# Patient Record
Sex: MALE | Race: WHITE | NOT HISPANIC OR LATINO | Employment: FULL TIME | URBAN - METROPOLITAN AREA
[De-identification: names, ages, dates, MRNs, and addresses within clinical notes are randomized per-mention and may not be internally consistent; named-entity substitution may affect disease eponyms.]

---

## 2020-06-11 ENCOUNTER — TELEPHONE (OUTPATIENT)
Dept: PULMONOLOGY | Facility: CLINIC | Age: 50
End: 2020-06-11

## 2020-06-11 DIAGNOSIS — J18.9 PNEUMONIA OF LEFT LUNG DUE TO INFECTIOUS ORGANISM, UNSPECIFIED PART OF LUNG: Primary | ICD-10-CM

## 2020-06-11 DIAGNOSIS — R05.9 COUGH: ICD-10-CM

## 2020-06-11 NOTE — TELEPHONE ENCOUNTER
----- Message from Shayla Turner sent at 6/11/2020  1:33 PM CDT -----  Contact: Patients wife, Milly Atkins has a referral from Dr Sg Renae for patient to see Dr Hammond for chronic cough with blood in mucous, but first available is in August, would like to have patient seen before them, please call patient back at [phone]

## 2020-06-18 ENCOUNTER — HOSPITAL ENCOUNTER (OUTPATIENT)
Dept: RADIOLOGY | Facility: HOSPITAL | Age: 50
Discharge: HOME | End: 2020-06-18
Attending: INTERNAL MEDICINE
Payer: COMMERCIAL

## 2020-06-18 ENCOUNTER — CLINICAL SUPPORT (OUTPATIENT)
Dept: PULMONOLOGY | Facility: CLINIC | Age: 50
End: 2020-06-18
Payer: COMMERCIAL

## 2020-06-18 ENCOUNTER — TELEPHONE (OUTPATIENT)
Dept: RHEUMATOLOGY | Facility: CLINIC | Age: 50
End: 2020-06-18

## 2020-06-18 ENCOUNTER — LAB VISIT (OUTPATIENT)
Dept: LAB | Facility: HOSPITAL | Age: 50
End: 2020-06-18
Attending: INTERNAL MEDICINE
Payer: COMMERCIAL

## 2020-06-18 ENCOUNTER — OFFICE VISIT (OUTPATIENT)
Dept: PULMONOLOGY | Facility: CLINIC | Age: 50
End: 2020-06-18
Payer: COMMERCIAL

## 2020-06-18 VITALS
RESPIRATION RATE: 19 BRPM | SYSTOLIC BLOOD PRESSURE: 132 MMHG | BODY MASS INDEX: 28.78 KG/M2 | OXYGEN SATURATION: 98 % | DIASTOLIC BLOOD PRESSURE: 78 MMHG | HEIGHT: 73 IN | TEMPERATURE: 98 F | WEIGHT: 217.13 LBS | HEART RATE: 70 BPM

## 2020-06-18 DIAGNOSIS — J44.9 CHRONIC OBSTRUCTIVE PULMONARY DISEASE, UNSPECIFIED COPD TYPE: ICD-10-CM

## 2020-06-18 DIAGNOSIS — J18.9 PNEUMONIA OF LEFT LUNG DUE TO INFECTIOUS ORGANISM, UNSPECIFIED PART OF LUNG: ICD-10-CM

## 2020-06-18 DIAGNOSIS — Z79.52 CURRENT CHRONIC USE OF SYSTEMIC STEROIDS: ICD-10-CM

## 2020-06-18 DIAGNOSIS — M45.9 ANKYLOSING SPONDYLITIS, UNSPECIFIED SITE OF SPINE: ICD-10-CM

## 2020-06-18 DIAGNOSIS — R05.9 COUGH: ICD-10-CM

## 2020-06-18 DIAGNOSIS — F17.210 TOBACCO DEPENDENCE DUE TO CIGARETTES: ICD-10-CM

## 2020-06-18 DIAGNOSIS — R93.89 ABNORMAL CT OF THE CHEST: Primary | ICD-10-CM

## 2020-06-18 LAB
ALBUMIN SERPL BCP-MCNC: 4.1 G/DL (ref 3.5–5.2)
ALP SERPL-CCNC: 67 U/L (ref 55–135)
ALT SERPL W/O P-5'-P-CCNC: 19 U/L (ref 10–44)
ANION GAP SERPL CALC-SCNC: 7 MMOL/L (ref 8–16)
AST SERPL-CCNC: 28 U/L (ref 10–40)
BASOPHILS # BLD AUTO: 0.06 K/UL (ref 0–0.2)
BASOPHILS NFR BLD: 0.5 % (ref 0–1.9)
BILIRUB SERPL-MCNC: 0.5 MG/DL (ref 0.1–1)
BUN SERPL-MCNC: 22 MG/DL (ref 6–20)
CALCIUM SERPL-MCNC: 9 MG/DL (ref 8.7–10.5)
CHLORIDE SERPL-SCNC: 104 MMOL/L (ref 95–110)
CO2 SERPL-SCNC: 27 MMOL/L (ref 23–29)
CREAT SERPL-MCNC: 1.5 MG/DL (ref 0.5–1.4)
CRP SERPL-MCNC: 2.1 MG/L (ref 0–8.2)
DIFFERENTIAL METHOD: ABNORMAL
EOSINOPHIL # BLD AUTO: 0.2 K/UL (ref 0–0.5)
EOSINOPHIL NFR BLD: 1.4 % (ref 0–8)
ERYTHROCYTE [DISTWIDTH] IN BLOOD BY AUTOMATED COUNT: 14.3 % (ref 11.5–14.5)
ERYTHROCYTE [SEDIMENTATION RATE] IN BLOOD BY WESTERGREN METHOD: 2 MM/HR (ref 0–10)
EST. GFR  (AFRICAN AMERICAN): >60 ML/MIN/1.73 M^2
EST. GFR  (NON AFRICAN AMERICAN): 53.9 ML/MIN/1.73 M^2
GLUCOSE SERPL-MCNC: 104 MG/DL (ref 70–110)
HCT VFR BLD AUTO: 48.6 % (ref 40–54)
HGB BLD-MCNC: 15.3 G/DL (ref 14–18)
IMM GRANULOCYTES # BLD AUTO: 0.1 K/UL (ref 0–0.04)
IMM GRANULOCYTES NFR BLD AUTO: 0.8 % (ref 0–0.5)
LDH SERPL L TO P-CCNC: 198 U/L (ref 110–260)
LYMPHOCYTES # BLD AUTO: 3 K/UL (ref 1–4.8)
LYMPHOCYTES NFR BLD: 24.6 % (ref 18–48)
MCH RBC QN AUTO: 30 PG (ref 27–31)
MCHC RBC AUTO-ENTMCNC: 31.5 G/DL (ref 32–36)
MCV RBC AUTO: 95 FL (ref 82–98)
MONOCYTES # BLD AUTO: 1.1 K/UL (ref 0.3–1)
MONOCYTES NFR BLD: 9.5 % (ref 4–15)
NEUTROPHILS # BLD AUTO: 7.6 K/UL (ref 1.8–7.7)
NEUTROPHILS NFR BLD: 63.2 % (ref 38–73)
NRBC BLD-RTO: 0 /100 WBC
PLATELET # BLD AUTO: 408 K/UL (ref 150–350)
PMV BLD AUTO: 8.7 FL (ref 9.2–12.9)
POTASSIUM SERPL-SCNC: 4 MMOL/L (ref 3.5–5.1)
PROT SERPL-MCNC: 7.3 G/DL (ref 6–8.4)
RBC # BLD AUTO: 5.1 M/UL (ref 4.6–6.2)
SODIUM SERPL-SCNC: 138 MMOL/L (ref 136–145)
WBC # BLD AUTO: 12.05 K/UL (ref 3.9–12.7)

## 2020-06-18 PROCEDURE — 3008F BODY MASS INDEX DOCD: CPT | Mod: CPTII,S$GLB,, | Performed by: INTERNAL MEDICINE

## 2020-06-18 PROCEDURE — 3008F PR BODY MASS INDEX (BMI) DOCUMENTED: ICD-10-PCS | Mod: CPTII,S$GLB,, | Performed by: INTERNAL MEDICINE

## 2020-06-18 PROCEDURE — 99205 OFFICE O/P NEW HI 60 MIN: CPT | Mod: S$GLB,,, | Performed by: INTERNAL MEDICINE

## 2020-06-18 PROCEDURE — 99999 PR PBB SHADOW E&M-EST. PATIENT-LVL V: ICD-10-PCS | Mod: PBBFAC,,, | Performed by: INTERNAL MEDICINE

## 2020-06-18 PROCEDURE — 85025 COMPLETE CBC W/AUTO DIFF WBC: CPT

## 2020-06-18 PROCEDURE — 80053 COMPREHEN METABOLIC PANEL: CPT

## 2020-06-18 PROCEDURE — 71046 X-RAY EXAM CHEST 2 VIEWS: CPT | Mod: 26,,, | Performed by: RADIOLOGY

## 2020-06-18 PROCEDURE — 99205 PR OFFICE/OUTPT VISIT, NEW, LEVL V, 60-74 MIN: ICD-10-PCS | Mod: S$GLB,,, | Performed by: INTERNAL MEDICINE

## 2020-06-18 PROCEDURE — 71046 X-RAY EXAM CHEST 2 VIEWS: CPT | Mod: TC

## 2020-06-18 PROCEDURE — 86140 C-REACTIVE PROTEIN: CPT

## 2020-06-18 PROCEDURE — 85651 RBC SED RATE NONAUTOMATED: CPT

## 2020-06-18 PROCEDURE — 36415 COLL VENOUS BLD VENIPUNCTURE: CPT

## 2020-06-18 PROCEDURE — 83615 LACTATE (LD) (LDH) ENZYME: CPT

## 2020-06-18 PROCEDURE — 99999 PR PBB SHADOW E&M-EST. PATIENT-LVL V: CPT | Mod: PBBFAC,,, | Performed by: INTERNAL MEDICINE

## 2020-06-18 PROCEDURE — 71046 XR CHEST PA AND LATERAL: ICD-10-PCS | Mod: 26,,, | Performed by: RADIOLOGY

## 2020-06-18 RX ORDER — CIPROFLOXACIN 500 MG/1
TABLET ORAL
COMMUNITY
End: 2020-06-18

## 2020-06-18 RX ORDER — NYSTATIN 100000 [USP'U]/ML
SUSPENSION ORAL
COMMUNITY
Start: 2020-05-20

## 2020-06-18 RX ORDER — TESTOSTERONE CYPIONATE 200 MG/ML
INJECTION, SOLUTION INTRAMUSCULAR
COMMUNITY
Start: 2020-05-27

## 2020-06-18 RX ORDER — PREDNISONE 10 MG/1
TABLET ORAL
COMMUNITY

## 2020-06-18 RX ORDER — SULFAMETHOXAZOLE AND TRIMETHOPRIM 800; 160 MG/1; MG/1
TABLET ORAL
COMMUNITY
End: 2020-06-18

## 2020-06-18 RX ORDER — DICLOFENAC SODIUM 10 MG/G
GEL TOPICAL
COMMUNITY
Start: 2020-04-21

## 2020-06-18 RX ORDER — BUPROPION HYDROCHLORIDE 150 MG/1
TABLET, EXTENDED RELEASE ORAL
COMMUNITY

## 2020-06-18 RX ORDER — IPRATROPIUM BROMIDE AND ALBUTEROL SULFATE 2.5; .5 MG/3ML; MG/3ML
SOLUTION RESPIRATORY (INHALATION)
COMMUNITY
Start: 2020-05-19

## 2020-06-18 RX ORDER — AZITHROMYCIN 250 MG/1
250 TABLET, FILM COATED ORAL DAILY
COMMUNITY
Start: 2020-05-02 | End: 2020-06-18

## 2020-06-18 RX ORDER — ALBUTEROL SULFATE 1.25 MG/3ML
3 SOLUTION RESPIRATORY (INHALATION)
COMMUNITY
End: 2020-06-18

## 2020-06-18 RX ORDER — ALBUTEROL SULFATE 0.83 MG/ML
SOLUTION RESPIRATORY (INHALATION)
COMMUNITY
Start: 2020-05-02

## 2020-06-18 RX ORDER — BUDESONIDE AND FORMOTEROL FUMARATE DIHYDRATE 160; 4.5 UG/1; UG/1
2 AEROSOL RESPIRATORY (INHALATION) 2 TIMES DAILY
COMMUNITY
Start: 2020-05-30 | End: 2020-06-18

## 2020-06-18 RX ORDER — LEVOFLOXACIN 750 MG/1
750 TABLET ORAL DAILY
COMMUNITY
Start: 2020-05-27 | End: 2020-06-18

## 2020-06-18 RX ORDER — AMOXICILLIN AND CLAVULANATE POTASSIUM 875; 125 MG/1; MG/1
1 TABLET, FILM COATED ORAL 2 TIMES DAILY
COMMUNITY
Start: 2020-05-16 | End: 2020-06-18

## 2020-06-18 RX ORDER — TADALAFIL 10 MG/1
TABLET ORAL
COMMUNITY
Start: 2020-05-19

## 2020-06-18 RX ORDER — MOMETASONE FUROATE 50 UG/1
SPRAY, METERED NASAL
COMMUNITY
Start: 2020-05-11

## 2020-06-18 RX ORDER — CLINDAMYCIN HYDROCHLORIDE 300 MG/1
300 CAPSULE ORAL 3 TIMES DAILY
COMMUNITY
Start: 2020-03-19

## 2020-06-18 RX ORDER — IBUPROFEN 800 MG/1
800 TABLET ORAL EVERY 8 HOURS
COMMUNITY
Start: 2020-05-02

## 2020-06-18 RX ORDER — SILDENAFIL 50 MG/1
TABLET, FILM COATED ORAL
COMMUNITY
Start: 2020-03-23

## 2020-06-18 RX ORDER — TIOTROPIUM BROMIDE AND OLODATEROL 3.124; 2.736 UG/1; UG/1
SPRAY, METERED RESPIRATORY (INHALATION)
COMMUNITY
Start: 2020-05-28

## 2020-06-18 RX ORDER — VARENICLINE TARTRATE 1 MG/1
TABLET, FILM COATED ORAL
COMMUNITY

## 2020-06-18 RX ORDER — TIOTROPIUM BROMIDE 18 UG/1
18 CAPSULE ORAL; RESPIRATORY (INHALATION)
COMMUNITY
End: 2020-06-18

## 2020-06-18 RX ORDER — BUPRENORPHINE AND NALOXONE 8; 2 MG/1; MG/1
FILM, SOLUBLE BUCCAL; SUBLINGUAL
COMMUNITY
Start: 2020-06-16

## 2020-06-18 RX ORDER — TAMSULOSIN HYDROCHLORIDE 0.4 MG/1
1 CAPSULE ORAL DAILY
COMMUNITY
Start: 2020-05-28

## 2020-06-18 RX ORDER — ALBUTEROL SULFATE 90 UG/1
AEROSOL, METERED RESPIRATORY (INHALATION)
COMMUNITY
Start: 2020-06-08

## 2020-06-18 NOTE — PROGRESS NOTES
Initial Outpatient Pulmonary Evaluation       SUBJECTIVE:     Chief Complaint   Patient presents with    Cough    Pneumonia       History of Present Illness:    Patient is a 49 y.o. male presenting for 2nd opinion.    Known with chronic COPD, on multiple inhalers, 30 pack year smoker last few years more than 2 packs per day, frequent pneumonias 2 times over the last 6 months, history of ankylosing spondylitis previously treated with Remicade methotrexate and Enbrel and currently he is taking on his own prednisone as needed, states he used about 90 pills  of prednisone 10 mg over the last 6 months.  Has not been following with rheumatology for few years.  Not on Bactrim prophylaxis.    Patient was evaluated by his pulmonologist a month ago.  Did have a CT scan of the chest with IV contrast that showed lower lobe atelectasis/infiltrates.    Complaining of left pleuritic chest pain.        Review of Systems   Constitutional: Positive for weakness. Negative for fever and chills.   HENT: Positive for congestion. Negative for nosebleeds.    Eyes: Negative for redness.   Respiratory: Positive for cough, sputum production, shortness of breath, wheezing, dyspnea on extertion and use of rescue inhaler. Negative for choking.    Cardiovascular: Positive for chest pain.   Genitourinary: Negative for hematuria.   Endocrine: Negative for cold intolerance.    Musculoskeletal: Positive for arthralgias.   Skin: Negative for rash.   Gastrointestinal: Negative for vomiting.   Neurological: Negative for syncope.   Hematological: Negative for adenopathy.   Psychiatric/Behavioral: Negative for confusion. The patient is nervous/anxious.        Review of patient's allergies indicates:  No Known Allergies    Current Outpatient Medications   Medication Sig Dispense Refill    albuterol (PROVENTIL) 2.5 mg /3 mL (0.083 %) nebulizer solution USE 1 VIAL PER NEBULIZATION EVERY 4 HOURS       albuterol-ipratropium (DUO-NEB) 2.5 mg-0.5 mg/3 mL nebulizer solution INHALE 1 VIAL INTO LUNGS VIA NEBULIZER FOUR TIMES DAILY      buprenorphine-naloxone (SUBOXONE) 8-2 mg Film DISSOLVE 1 FILM UNDER TONGUE THREE TIMES DAILY      buPROPion (WELLBUTRIN SR) 150 MG TBSR 12 hr tablet bupropion HCl  mg tablet,12 hr sustained-release      clindamycin (CLEOCIN) 300 MG capsule Take 300 mg by mouth 3 (three) times daily.      diclofenac sodium (VOLTAREN) 1 % Gel APPLY 2 TO 4-G AS NEEDED AT BEDTIME      ibuprofen (ADVIL,MOTRIN) 800 MG tablet Take 800 mg by mouth every 8 (eight) hours.      mometasone (NASONEX) 50 mcg/actuation nasal spray SPRAY 2 SPRAYS INTO EACH NOSTRIL DAILY      nystatin (MYCOSTATIN) 100,000 unit/mL suspension SWISH AND SPIT 5 MLS BY MOUTH FOUR TIMES DAILY      predniSONE (DELTASONE) 10 MG tablet prednisone 10 mg tablet      sildenafiL (VIAGRA) 50 MG tablet TAKE ONE TABLET BY MOUTH ONE HOUR PRIOR TO SEX. MAX 2 PILLS/DAY      STIOLTO RESPIMAT 2.5-2.5 mcg/actuation Mist INHALE 2 PUFFS INTO LUNGS DAILY FOR 30 DAYS      tadalafiL (CIALIS) 10 MG tablet TAKE 1 TABLET BY MOUTH PRIOR TO SEXUAL ACTIVITY 30 TO 45 MINUTES BEFORE SEX      tamsulosin (FLOMAX) 0.4 mg Cap Take 1 capsule by mouth once daily.      testosterone cypionate (DEPOTESTOTERONE CYPIONATE) 200 mg/mL injection INJECT 200 MG INTO THE MUSCLE EVERY WEEK      varenicline (CHANTIX) 1 mg Tab Chantix Continuing Month Box 1 mg tablet   Take 1 tablet twice a day by oral route for 30 days.      VENTOLIN HFA 90 mcg/actuation inhaler INHALE 2 PUFF(S) INTO THE LUNGS 3 TIMES A DAY BY INHALATION ROUTE FOR 30 DAYS.       No current facility-administered medications for this visit.        Past Medical History:   Diagnosis Date    Pneumonia 05/2003     History reviewed. No pertinent surgical history.  Family History   Problem Relation Age of Onset    Cancer Mother     Cancer Father     Cancer Paternal Aunt     Cancer Paternal Uncle   "    Social History     Tobacco Use    Smoking status: Former Smoker     Packs/day: 2.00     Years: 25.00     Pack years: 50.00     Types: Cigarettes     Start date:      Quit date:      Years since quittin.4   Substance Use Topics    Alcohol use: Not Currently    Drug use: Not Currently          OBJECTIVE:     Vital Signs (Most Recent)  Vital Signs  Temp: 98.2 °F (36.8 °C)  Temp src: Oral  Pulse: 70  Resp: 19  SpO2: 98 %  BP: 132/78  Height and Weight  Height: 6' 1" (185.4 cm)  Weight: 98.5 kg (217 lb 2.5 oz)  BSA (Calculated - sq m): 2.25 sq meters  BMI (Calculated): 28.7  Weight in (lb) to have BMI = 25: 189.1]  Wt Readings from Last 2 Encounters:   20 98.5 kg (217 lb 2.5 oz)   06/10/11 90 kg (198 lb 6.6 oz)         Physical Exam:  Physical Exam   Constitutional: He is oriented to person, place, and time. He appears well-developed and well-nourished.   HENT:   Head: Normocephalic.   Neck: Neck supple.   Cardiovascular: Normal rate, regular rhythm and normal heart sounds.   Pulmonary/Chest: Normal expansion and effort normal. No stridor. No respiratory distress. He has rales. He exhibits no tenderness.   Basilar rales   Abdominal: Soft.   Musculoskeletal:         General: No tenderness.   Lymphadenopathy:     He has no cervical adenopathy.   Neurological: He is alert and oriented to person, place, and time. Gait normal.   Skin: Skin is warm. No cyanosis. Nails show no clubbing.   Psychiatric: He has a normal mood and affect. His behavior is normal. Judgment and thought content normal.   Nursing note and vitals reviewed.      Laboratory  Lab Results   Component Value Date    WBC 11.73 (H) 06/10/2011    RBC 4.92 06/10/2011    HGB 15.4 06/10/2011    HCT 44.2 06/10/2011    MCV 89.8 06/10/2011    MCH 31.3 (H) 06/10/2011    MCHC 34.8 06/10/2011    RDW 13.4 06/10/2011     06/10/2011    MPV 8.9 (L) 06/10/2011    GRAN 7.5 2010    GRAN 66.7 2010    LYMPH 23.4 2010    LYMPH 2.6 " 04/09/2010    MONO 8.3 (H) 04/09/2010    MONO 0.9 (H) 04/09/2010    EOS 0.1 04/09/2010    BASO 0.0 04/09/2010    EOSINOPHIL 1.2 04/09/2010    BASOPHIL 0.4 04/09/2010       BMP  Lab Results   Component Value Date     06/10/2011    K 4.7 06/10/2011     06/10/2011    CO2 24 06/10/2011    BUN 22 (H) 06/10/2011    CREATININE 1.3 06/10/2011    CALCIUM 8.8 06/10/2011    ANIONGAP 17 06/10/2011    ESTGFRAFRICA >60 06/10/2011    EGFRNONAA >60 06/10/2011    AST 7 (L) 06/10/2011    ALT 15 06/10/2011    PROT 7.1 06/10/2011       No results found for: BNP    Lab Results   Component Value Date    TSH 0.642 04/09/2010       Lab Results   Component Value Date    SEDRATE 3 06/10/2011       Lab Results   Component Value Date    CRP 3.41 06/10/2011       No results found for: IGE    No results found for: ASPERGILLUS  No results found for: AFUMIGATUSCL     No results found for: ACE    Diagnostic Results:  I have personally reviewed today the following studies :    Outside CT chest report showed lower lobe atelectasis/consolidation/infiltrate.    Chest x-ray today     No acute cardiopulmonary process.     ASSESSMENT/PLAN:     Abnormal CT of the chest  -     CT Chest Without Contrast; Future; Expected date: 06/18/2020    Current chronic use of systemic steroids  -     CBC auto differential; Future; Expected date: 06/18/2020  -     Comprehensive metabolic panel; Future; Expected date: 06/18/2020  -     LACTATE DEHYDROGENASE; Future; Expected date: 06/18/2020  -     CT Chest Without Contrast; Future; Expected date: 06/18/2020  -     C-Reactive Protein; Future; Expected date: 06/18/2020  -     Sedimentation rate; Future; Expected date: 06/18/2020    Chronic obstructive pulmonary disease, unspecified COPD type  -     Complete PFT without bronchodilator; Future    Ankylosing spondylitis, unspecified site of spine  -     C-Reactive Protein; Future; Expected date: 06/18/2020  -     Sedimentation rate; Future; Expected date:  06/18/2020  -     Ambulatory referral/consult to Rheumatology; Future; Expected date: 06/25/2020    Tobacco dependence due to cigarettes     Continue albuterol p.r.n.    Instructed patient about the overlap between his multiple inhalers and I instructed him to continue Stioloto only in addition to albuterol p.r.n.    Will refer him to rheumatology for ankylosing spondylitis care.    Advised him not to take prednisone without MD recommendations.    Rule out ankylosing spondylitis related ILD versus opportunistic infection, check serology and check repeat CT chest and compared to May 2, 2020 assess for resolution versus progression of previously mentioned infiltrates.    Patient states he did quit smoking 4 weeks ago.    Encouraged to continue smoking cessation.    Further recommendation to follow above workup.          Follow up in about 1 month (around 7/18/2020).    This note was prepared using voice recognition system and is likely to have sound alike errors that may have been overlooked even after proof reading.  Please call me with any questions    Discussed diagnosis, its evaluation, treatment and usual course. All questions answered.    Thank you for the courtesy of participating in the care of this patient    Namrata Hammond MD

## 2020-06-18 NOTE — LETTER
June 18, 2020      Sg Bello Sr., MD  [address] [facility] [address]           [facility]  [address] [place] [address]  Phone: [phone]  Fax: [phone]          Patient: Curt Gonzalez Jr.   MR Number: [id number]   YOB: 1970   Date of Visit: 6/18/2020       Dear Dr. Sg Bello Sr.:    Thank you for referring Curt Gonzalez to me for evaluation. Attached you will find relevant portions of my assessment and plan of care.    If you have questions, please do not hesitate to call me. I look forward to following Curt Gonzalez along with you.    Sincerely,    Namrata Hammond MD    Enclosure  CC:  No Recipients    If you would like to receive this communication electronically, please contact [email or web address] or [phone] to request more information on [employer] Link access.    For providers and/or their staff who would like to refer a patient to [facility], please contact us through our one-stop-shop provider referral line, [facility], at [phone].    If you feel you have received this communication in error or would no longer like to receive these types of communications, please e-mail [email or web address]

## 2020-06-18 NOTE — PROGRESS NOTES
PFT without bronchodilator not completed today.  Patient needs to have Covid-19 testing done prior to PFT.  PFT will be rescheduled.

## 2020-06-18 NOTE — TELEPHONE ENCOUNTER
----- Message from Namrata Hammond MD sent at 6/18/2020 11:00 AM CDT -----  Please arrange for eval     Thx

## 2020-06-23 ENCOUNTER — HOSPITAL ENCOUNTER (OUTPATIENT)
Dept: RADIOLOGY | Facility: HOSPITAL | Age: 50
Discharge: HOME | End: 2020-06-23
Attending: INTERNAL MEDICINE
Payer: COMMERCIAL

## 2020-06-23 ENCOUNTER — OFFICE VISIT (OUTPATIENT)
Dept: RHEUMATOLOGY | Facility: CLINIC | Age: 50
End: 2020-06-23
Payer: COMMERCIAL

## 2020-06-23 VITALS
SYSTOLIC BLOOD PRESSURE: 156 MMHG | WEIGHT: 215.81 LBS | HEART RATE: 77 BPM | HEIGHT: 72 IN | BODY MASS INDEX: 29.23 KG/M2 | DIASTOLIC BLOOD PRESSURE: 93 MMHG

## 2020-06-23 DIAGNOSIS — M54.9 BACK PAIN, UNSPECIFIED BACK LOCATION, UNSPECIFIED BACK PAIN LATERALITY, UNSPECIFIED CHRONICITY: ICD-10-CM

## 2020-06-23 DIAGNOSIS — M45.9 ANKYLOSING SPONDYLITIS, UNSPECIFIED SITE OF SPINE: ICD-10-CM

## 2020-06-23 DIAGNOSIS — M54.9 DORSALGIA, UNSPECIFIED: ICD-10-CM

## 2020-06-23 DIAGNOSIS — M54.9 BACK PAIN, UNSPECIFIED BACK LOCATION, UNSPECIFIED BACK PAIN LATERALITY, UNSPECIFIED CHRONICITY: Primary | ICD-10-CM

## 2020-06-23 PROCEDURE — 72040 XR CERVICAL SPINE AP LATERAL: ICD-10-PCS | Mod: 26,,, | Performed by: RADIOLOGY

## 2020-06-23 PROCEDURE — 72200 XR SACROILIAC JOINTS 3 VIEWS: ICD-10-PCS | Mod: 26,,, | Performed by: RADIOLOGY

## 2020-06-23 PROCEDURE — 72100 XR LUMBAR SPINE AP AND LATERAL: ICD-10-PCS | Mod: 26,,, | Performed by: RADIOLOGY

## 2020-06-23 PROCEDURE — 72100 X-RAY EXAM L-S SPINE 2/3 VWS: CPT | Mod: 26,,, | Performed by: RADIOLOGY

## 2020-06-23 PROCEDURE — 72040 X-RAY EXAM NECK SPINE 2-3 VW: CPT | Mod: 26,,, | Performed by: RADIOLOGY

## 2020-06-23 PROCEDURE — 99244 OFF/OP CNSLTJ NEW/EST MOD 40: CPT | Mod: S$GLB,,, | Performed by: INTERNAL MEDICINE

## 2020-06-23 PROCEDURE — 99999 PR PBB SHADOW E&M-EST. PATIENT-LVL V: ICD-10-PCS | Mod: PBBFAC,,, | Performed by: INTERNAL MEDICINE

## 2020-06-23 PROCEDURE — 72040 X-RAY EXAM NECK SPINE 2-3 VW: CPT | Mod: TC

## 2020-06-23 PROCEDURE — 99244 PR OFFICE CONSULTATION,LEVEL IV: ICD-10-PCS | Mod: S$GLB,,, | Performed by: INTERNAL MEDICINE

## 2020-06-23 PROCEDURE — 72200 X-RAY EXAM SI JOINTS: CPT | Mod: 26,,, | Performed by: RADIOLOGY

## 2020-06-23 PROCEDURE — 72200 X-RAY EXAM SI JOINTS: CPT | Mod: TC

## 2020-06-23 PROCEDURE — 72100 X-RAY EXAM L-S SPINE 2/3 VWS: CPT | Mod: TC

## 2020-06-23 PROCEDURE — 99999 PR PBB SHADOW E&M-EST. PATIENT-LVL V: CPT | Mod: PBBFAC,,, | Performed by: INTERNAL MEDICINE

## 2020-06-23 NOTE — PROGRESS NOTES
RHEUMATOLOGY OUTPATIENT CLINIC NOTE    6/23/2020    Attending Rheumatologist: Alexi Amor  Primary Care Provider: Sg Bello Sr, MD   Physician Requesting Consultation: Namrata Hammond MD  [address] [place] [address] [place] [address]  Chief Complaint/Reason For Consultation:  ankylosing spondylitis    Subjective:       HPI  Curt Gonzalez Jr. is a 49 y.o. White male with historical diagnosis of ankylosis spondylitis referred for rheumatic evaluation.  Previously evaluated by Rheumatology, last seen on January 2013.  Documented for severe chronic AS with only partial response to IFX, Enbrel, chronic prednisone therapy and MTX 17.5mg.  Patient loss care, has being of DMARD or biologics for several years.  Recommended to reestablish care with Rheumatology    Today:  Voices no acute complaints.  Refers episodic pleuritic chest pain.  Denies significant neck or lower back pain that interfere with activities of daily living.  Does not have prolonged morning stiffness or peripheral joint swelling.  Denies personal or family history of psoriasis or IBD.  No history uveitis, dactylitis.  Denies  or GI complaints.    Review of Systems   Constitutional: Negative for chills, fever and malaise/fatigue.   Eyes: Negative for pain and redness.   Respiratory: Negative for cough, hemoptysis and shortness of breath.    Cardiovascular: Positive for chest pain (Chronic, episodic.  Pleuritic.). Negative for leg swelling.   Gastrointestinal: Negative for abdominal pain, blood in stool and melena.   Genitourinary: Negative for dysuria and hematuria.   Musculoskeletal: Negative for falls and joint pain.   Skin: Negative for rash.   Neurological: Negative for tingling and focal weakness.   Psychiatric/Behavioral: Negative for memory loss. The patient does not have insomnia.        Chronic comorbid conditions affecting medical decision making today:  Past Medical History:   Diagnosis Date    Acid reflux     Arthritis      COPD (chronic obstructive pulmonary disease)     Pneumonia 2003     History reviewed. No pertinent surgical history.  Family History   Problem Relation Age of Onset    Cancer Mother     Cancer Father     Cancer Paternal Aunt     Cancer Paternal Uncle      Social History     Substance and Sexual Activity   Alcohol Use Not Currently     Social History     Tobacco Use   Smoking Status Former Smoker    Packs/day: 2.00    Years: 25.00    Pack years: 50.00    Types: Cigarettes    Start date:     Quit date:     Years since quittin.4     Social History     Substance and Sexual Activity   Drug Use Not Currently       Current Outpatient Medications:     albuterol (PROVENTIL) 2.5 mg /3 mL (0.083 %) nebulizer solution, USE 1 VIAL PER NEBULIZATION EVERY 4 HOURS, Disp: , Rfl:     albuterol-ipratropium (DUO-NEB) 2.5 mg-0.5 mg/3 mL nebulizer solution, INHALE 1 VIAL INTO LUNGS VIA NEBULIZER FOUR TIMES DAILY, Disp: , Rfl:     buprenorphine-naloxone (SUBOXONE) 8-2 mg Film, DISSOLVE 1 FILM UNDER TONGUE THREE TIMES DAILY, Disp: , Rfl:     diclofenac sodium (VOLTAREN) 1 % Gel, APPLY 2 TO 4-G AS NEEDED AT BEDTIME, Disp: , Rfl:     ibuprofen (ADVIL,MOTRIN) 800 MG tablet, Take 800 mg by mouth every 8 (eight) hours., Disp: , Rfl:     mometasone (NASONEX) 50 mcg/actuation nasal spray, SPRAY 2 SPRAYS INTO EACH NOSTRIL DAILY, Disp: , Rfl:     nystatin (MYCOSTATIN) 100,000 unit/mL suspension, SWISH AND SPIT 5 MLS BY MOUTH FOUR TIMES DAILY, Disp: , Rfl:     predniSONE (DELTASONE) 10 MG tablet, prednisone 10 mg tablet, Disp: , Rfl:     tadalafiL (CIALIS) 10 MG tablet, TAKE 1 TABLET BY MOUTH PRIOR TO SEXUAL ACTIVITY 30 TO 45 MINUTES BEFORE SEX, Disp: , Rfl:     testosterone cypionate (DEPOTESTOTERONE CYPIONATE) 200 mg/mL injection, INJECT 200 MG INTO THE MUSCLE EVERY WEEK, Disp: , Rfl:     buPROPion (WELLBUTRIN SR) 150 MG TBSR 12 hr tablet, bupropion HCl  mg tablet,12 hr sustained-release, Disp: ,  Rfl:     clindamycin (CLEOCIN) 300 MG capsule, Take 300 mg by mouth 3 (three) times daily., Disp: , Rfl:     sildenafiL (VIAGRA) 50 MG tablet, TAKE ONE TABLET BY MOUTH ONE HOUR PRIOR TO SEX. MAX 2 PILLS/DAY, Disp: , Rfl:     STIOLTO RESPIMAT 2.5-2.5 mcg/actuation Mist, INHALE 2 PUFFS INTO LUNGS DAILY FOR 30 DAYS, Disp: , Rfl:     tamsulosin (FLOMAX) 0.4 mg Cap, Take 1 capsule by mouth once daily., Disp: , Rfl:     varenicline (CHANTIX) 1 mg Tab, Chantix Continuing Month Box 1 mg tablet  Take 1 tablet twice a day by oral route for 30 days., Disp: , Rfl:     VENTOLIN HFA 90 mcg/actuation inhaler, INHALE 2 PUFF(S) INTO THE LUNGS 3 TIMES A DAY BY INHALATION ROUTE FOR 30 DAYS., Disp: , Rfl:      Objective:         Vitals:    06/23/20 1550   BP: (!) 156/93   Pulse: 77     Physical Exam   Constitutional: No distress.   Estimated body mass index is 29.27 kg/m² as calculated from the following:    Height as of this encounter: 6' (1.829 m).    Weight as of this encounter: 97.9 kg (215 lb 13.3 oz).    Wt Readings from Last 1 Encounters:  06/23/20 1550 : 97.9 kg (215 lb 13.3 oz)     HENT:   Head: Normocephalic and atraumatic.   Eyes: Conjunctivae are normal. Pupils are equal, round, and reactive to light.   Neck: Normal range of motion.   Cardiovascular: Normal rate and intact distal pulses.    Pulmonary/Chest: Effort normal. No respiratory distress.   Abdominal: Soft. He exhibits no distension.   Neurological: He is alert. Gait normal.   Skin: No rash noted. No erythema.     Musculoskeletal: Normal range of motion. No tenderness.      Comments: : strong  No synovitis or significant squeeze tenderness    AROM: intact  PROM: intact    Devices used by patient: none    History of vertebral fracture status post falling from height in setting of chronic steroid use       Reviewed old and all outside pertinent medical records available.    All lab results personally reviewed and interpreted by me.  Lab Results   Component  Value Date    WBC 12.05 06/18/2020    HGB 15.3 06/18/2020    HCT 48.6 06/18/2020    MCV 95 06/18/2020    MCH 30.0 06/18/2020    MCHC 31.5 (L) 06/18/2020    RDW 14.3 06/18/2020     (H) 06/18/2020    MPV 8.7 (L) 06/18/2020    PLTEST Adequate 06/10/2011       Lab Results   Component Value Date     06/18/2020    K 4.0 06/18/2020     06/18/2020    CO2 27 06/18/2020     06/18/2020    BUN 22 (H) 06/18/2020    CALCIUM 9.0 06/18/2020    PROT 7.3 06/18/2020    ALBUMIN 4.1 06/18/2020    BILITOT 0.5 06/18/2020    AST 28 06/18/2020    ALKPHOS 67 06/18/2020    ALT 19 06/18/2020       Lab Results   Component Value Date    COLORU Yellow 10/01/2008    APPEARANCEUA Clear 10/01/2008    SPECGRAV 1.020 10/01/2008    PHUR 6.0 10/01/2008    PROTEINUA Negative 10/01/2008    KETONESU Negative 10/01/2008    LEUKOCYTESUR Negative 10/01/2008    NITRITE Negative 10/01/2008       Lab Results   Component Value Date    CRP 2.1 06/18/2020       Lab Results   Component Value Date    SEDRATE 2 06/18/2020       Lab Results   Component Value Date    REBECCA Negative 06/10/2011    RF Negative 11/12/2007    SEDRATE 2 06/18/2020       No components found for: 25OHVITDTOT, 49NKKEFL5, 26AQZHHP2, METHODNOTE    No results found for: URICACID    No components found for: TSPOTTB    Rheum Labs:   REBECCA negative   Rheumatoid factor negative     Infectious Labs:   Hepatitis profile nonreactive 2007     Imaging:  All imaging reviewed and independently  interpreted by me.    CT chest with contrast March 2016  There is no acute bony abnormality suggested.    Chest x-ray March 2018  The heart is normal in size and configuration.  The lungs are clear and well expanded with no active infiltrate, effusion, or pneumothorax demonstrated.    Chest x-ray June 2020  Cardiac silhouette and mediastinal contours are normal.  Lungs are clear.  Osseous structures are intact.    Patient provided chest CT 6/2020:  No mention of ankylosis on visualized osseous  structures    DEXA scan  January 2013  FN: -2.3 (0.787)  LS: -0.3     ASSESSMENT / PLAN:     Curt Gonzalez Jr. is a 49 y.o. White male with:    1.  Historical diagnosis of Ankylosing spondylitis  - preserved range of motion, documented for failure to infliximab.    - Off biologics for several years.  Preservation range of motion preserved on exam.  No synovitis appreciated  - History of vertebral fracture from high in setting of chronic systemic steroid use   - available imaging with no features of ankylosis reported  - x-ray SI/LS to assess for ankylosis.  Consider MRI to assess for no radiographic spondyloarthritis  - HLA B27 Antigen; Future  - X-Ray Sacroiliac Joints 3 Views; Future  - X-Ray Lumbar Spine AP And Lateral; Future  - X-Ray Cervical Spine AP And Lateral; Future  - Hepatitis Panel, Acute; Future  - Quantiferon Gold TB; Future    2. Other specified counseling  - over 10 minutes spent regarding below topics:  - Immunization counseling done.  - smoking cessation  - Weight loss counseling done.  - Nutrition and exercise counseling.  - Limitation of alcohol consumption.  - Regular exercise:  Aerobic and resistance.  - Medication counseling provided.    Follow up in about 2 weeks (around 7/7/2020).    Method of contact with patient concerns: [facility]    Disclaimer:  This note is prepared using voice recognition software and as such is likely to have errors and has not been proof read. Please contact me for questions.     Time spent: 60 minutes in face to face discussion concerning diagnosis, prognosis, review of lab and test results, benefits of treatment as well as management of disease, counseling of patient and coordination of care between various health care providers.  Greater than half the time spent was used for coordination of care and counseling of patient.    Alexi Amor M.D.  Rheumatology Department   [facility]

## 2020-06-23 NOTE — LETTER
June 23, 2020      Namrata Hammond MD  [address] [place] [address] [address] [place] [address]           [facility]  [address] [facility]  [place] [address]  Phone: [phone]  Fax: [phone]          Patient: Curt Gonzalez Jr.   MR Number: [id number]   YOB: 1970   Date of Visit: 6/23/2020       Dear Dr. Namrata Hammond:    Thank you for referring Curt Gonzalez to me for evaluation. Attached you will find relevant portions of my assessment and plan of care.    If you have questions, please do not hesitate to call me. I look forward to following Curt Gonzalez along with you.    Sincerely,    Alexi Amor MD    Enclosure  CC:  No Recipients    If you would like to receive this communication electronically, please contact [email or web address] or [phone] to request more information on [employer] Link access.    For providers and/or their staff who would like to refer a patient to [facility], please contact us through our one-stop-shop provider referral line, [facility] , at [phone].    If you feel you have received this communication in error or would no longer like to receive these types of communications, please e-mail [email or web address]

## 2020-06-29 ENCOUNTER — LAB VISIT (OUTPATIENT)
Dept: LAB | Facility: HOSPITAL | Age: 50
End: 2020-06-29
Attending: INTERNAL MEDICINE
Payer: COMMERCIAL

## 2020-06-29 DIAGNOSIS — M54.9 BACK PAIN, UNSPECIFIED BACK LOCATION, UNSPECIFIED BACK PAIN LATERALITY, UNSPECIFIED CHRONICITY: ICD-10-CM

## 2020-06-29 DIAGNOSIS — M45.9 ANKYLOSING SPONDYLITIS, UNSPECIFIED SITE OF SPINE: ICD-10-CM

## 2020-06-29 PROCEDURE — 81374 HLA I TYPING 1 ANTIGEN LR: CPT

## 2020-06-29 PROCEDURE — 86480 TB TEST CELL IMMUN MEASURE: CPT

## 2020-06-29 PROCEDURE — 80074 ACUTE HEPATITIS PANEL: CPT

## 2020-06-29 PROCEDURE — 36415 COLL VENOUS BLD VENIPUNCTURE: CPT

## 2020-06-30 LAB
HAV IGM SERPL QL IA: NEGATIVE
HBV CORE IGM SERPL QL IA: NEGATIVE
HBV SURFACE AG SERPL QL IA: NEGATIVE
HCV AB SERPL QL IA: NEGATIVE

## 2020-07-01 LAB
GAMMA INTERFERON BACKGROUND BLD IA-ACNC: 0.02 IU/ML
M TB IFN-G CD4+ BCKGRND COR BLD-ACNC: 0.03 IU/ML
MITOGEN IGNF BCKGRD COR BLD-ACNC: 6.54 IU/ML
TB GOLD PLUS: NEGATIVE
TB2 - NIL: 0 IU/ML

## 2020-07-08 LAB
HLA B27 INTERPRETATION: NORMAL
HLA-B27 RELATED AG QL: NEGATIVE
HLA-B27 RELATED AG QL: NORMAL

## 2020-07-09 ENCOUNTER — TELEPHONE (OUTPATIENT)
Dept: PULMONOLOGY | Facility: CLINIC | Age: 50
End: 2020-07-09

## 2020-07-09 NOTE — TELEPHONE ENCOUNTER
Spoke to pt he is still experiencing pain his left lung when seen last in clinic pt was put on prednisone through his PCP which subsided pain and he felt better until he was instructed to take 2 a day which flared back up the symptoms.

## 2020-07-09 NOTE — TELEPHONE ENCOUNTER
----- Message from Nereida Clarke sent at 7/8/2020  4:48 PM CDT -----  Contact: PATIENT  Type:  Sooner Apoointment Request    Caller is requesting a sooner appointment.  Caller declined first available appointment listed below.  Caller will not accept being placed on the waitlist and is requesting a message be sent to doctor.  Name of Caller:Curt Gonzalez Jr.  When is the first available appointment?7/31/20 IS PATIENT'S APPOINTMENT   Symptoms: LT ARM PAIN  Would the patient rather a call back or a response via [facility]? CALL BACK   Best Call Back Number:[phone] (cell)   Additional Information: PATIENT WOULD LIKE HIS APPOINTMENT MOVED UP FROM 7/31/20. THE BEFORE THE WEEK IS OUT OR NEXT WEEK

## 2020-07-15 ENCOUNTER — HOSPITAL ENCOUNTER (OUTPATIENT)
Dept: RADIOLOGY | Facility: HOSPITAL | Age: 50
Discharge: HOME | End: 2020-07-15
Attending: INTERNAL MEDICINE
Payer: COMMERCIAL

## 2020-07-15 ENCOUNTER — OFFICE VISIT (OUTPATIENT)
Dept: PULMONOLOGY | Facility: CLINIC | Age: 50
End: 2020-07-15
Payer: COMMERCIAL

## 2020-07-15 ENCOUNTER — TELEPHONE (OUTPATIENT)
Dept: PULMONOLOGY | Facility: CLINIC | Age: 50
End: 2020-07-15

## 2020-07-15 VITALS
BODY MASS INDEX: 29.62 KG/M2 | WEIGHT: 218.69 LBS | HEART RATE: 74 BPM | HEIGHT: 72 IN | RESPIRATION RATE: 18 BRPM | SYSTOLIC BLOOD PRESSURE: 160 MMHG | TEMPERATURE: 97 F | OXYGEN SATURATION: 97 % | DIASTOLIC BLOOD PRESSURE: 100 MMHG

## 2020-07-15 DIAGNOSIS — Z87.891 FORMER SMOKER: ICD-10-CM

## 2020-07-15 DIAGNOSIS — R91.8 ABNORMAL CT LUNG SCREENING: Primary | ICD-10-CM

## 2020-07-15 DIAGNOSIS — R07.81 PLEURITIC CHEST PAIN: ICD-10-CM

## 2020-07-15 DIAGNOSIS — J44.9 CHRONIC OBSTRUCTIVE PULMONARY DISEASE, UNSPECIFIED COPD TYPE: Primary | ICD-10-CM

## 2020-07-15 DIAGNOSIS — Z87.01 HISTORY OF PNEUMONIA: ICD-10-CM

## 2020-07-15 DIAGNOSIS — R93.89 ABNORMAL CT OF THE CHEST: ICD-10-CM

## 2020-07-15 DIAGNOSIS — Z79.52 CURRENT CHRONIC USE OF SYSTEMIC STEROIDS: ICD-10-CM

## 2020-07-15 PROCEDURE — 71250 CT CHEST WITHOUT CONTRAST: ICD-10-PCS | Mod: 26,,, | Performed by: RADIOLOGY

## 2020-07-15 PROCEDURE — 99999 PR PBB SHADOW E&M-EST. PATIENT-LVL III: CPT | Mod: PBBFAC,,, | Performed by: INTERNAL MEDICINE

## 2020-07-15 PROCEDURE — 3008F PR BODY MASS INDEX (BMI) DOCUMENTED: ICD-10-PCS | Mod: CPTII,S$GLB,, | Performed by: INTERNAL MEDICINE

## 2020-07-15 PROCEDURE — 99214 OFFICE O/P EST MOD 30 MIN: CPT | Mod: S$GLB,,, | Performed by: INTERNAL MEDICINE

## 2020-07-15 PROCEDURE — 3008F BODY MASS INDEX DOCD: CPT | Mod: CPTII,S$GLB,, | Performed by: INTERNAL MEDICINE

## 2020-07-15 PROCEDURE — 99999 PR PBB SHADOW E&M-EST. PATIENT-LVL III: ICD-10-PCS | Mod: PBBFAC,,, | Performed by: INTERNAL MEDICINE

## 2020-07-15 PROCEDURE — 71250 CT THORAX DX C-: CPT | Mod: TC

## 2020-07-15 PROCEDURE — 71250 CT THORAX DX C-: CPT | Mod: 26,,, | Performed by: RADIOLOGY

## 2020-07-15 PROCEDURE — 99214 PR OFFICE/OUTPT VISIT, EST, LEVL IV, 30-39 MIN: ICD-10-PCS | Mod: S$GLB,,, | Performed by: INTERNAL MEDICINE

## 2020-07-15 NOTE — PROGRESS NOTES
Pulmonary Outpatient Follow Up Visit     Subjective:       Patient ID: Curt Gonzalez Jr. is a 49 y.o. male.    Chief Complaint: COPD      HPI        49-year-old male patient presenting for 1 month follow-up.    Evaluated in months ago as a 2nd opinion, does have chronic COPD he was on multiple inhalers, I committed him to albuterol and Stiolto as a controller inhaler.    Vague history of ankylosing spondylitis at some point on Remicade and methotrexate and Enbrel and prednisone with chronic use.    I had a concern for PCP pneumonia on his initial visit, LDH was within normal  Review of Systems   Constitutional: Positive for weakness. Negative for fever and chills.   HENT: Positive for congestion. Negative for nosebleeds.    Eyes: Negative for redness.   Respiratory: Positive for cough, sputum production, shortness of breath, wheezing, dyspnea on extertion and use of rescue inhaler. Negative for choking.    Cardiovascular: Positive for chest pain.   Genitourinary: Negative for hematuria.   Endocrine: Negative for cold intolerance.    Musculoskeletal: Positive for arthralgias.   Skin: Negative for rash.   Gastrointestinal: Negative for vomiting.   Neurological: Negative for syncope.   Hematological: Negative for adenopathy.   Psychiatric/Behavioral: Negative for confusion. The patient is nervous/anxious.        Outpatient Encounter Medications as of 7/15/2020   Medication Sig Dispense Refill    albuterol (PROVENTIL) 2.5 mg /3 mL (0.083 %) nebulizer solution USE 1 VIAL PER NEBULIZATION EVERY 4 HOURS      albuterol-ipratropium (DUO-NEB) 2.5 mg-0.5 mg/3 mL nebulizer solution INHALE 1 VIAL INTO LUNGS VIA NEBULIZER FOUR TIMES DAILY      buprenorphine-naloxone (SUBOXONE) 8-2 mg Film DISSOLVE 1 FILM UNDER TONGUE THREE TIMES DAILY      buPROPion (WELLBUTRIN SR) 150 MG TBSR 12 hr tablet bupropion HCl  mg tablet,12 hr sustained-release      diclofenac sodium (VOLTAREN) 1 %  Gel APPLY 2 TO 4-G AS NEEDED AT BEDTIME      ibuprofen (ADVIL,MOTRIN) 800 MG tablet Take 800 mg by mouth every 8 (eight) hours.      mometasone (NASONEX) 50 mcg/actuation nasal spray SPRAY 2 SPRAYS INTO EACH NOSTRIL DAILY      nystatin (MYCOSTATIN) 100,000 unit/mL suspension SWISH AND SPIT 5 MLS BY MOUTH FOUR TIMES DAILY      sildenafiL (VIAGRA) 50 MG tablet TAKE ONE TABLET BY MOUTH ONE HOUR PRIOR TO SEX. MAX 2 PILLS/DAY      STIOLTO RESPIMAT 2.5-2.5 mcg/actuation Mist INHALE 2 PUFFS INTO LUNGS DAILY FOR 30 DAYS      tadalafiL (CIALIS) 10 MG tablet TAKE 1 TABLET BY MOUTH PRIOR TO SEXUAL ACTIVITY 30 TO 45 MINUTES BEFORE SEX      tamsulosin (FLOMAX) 0.4 mg Cap Take 1 capsule by mouth once daily.      testosterone cypionate (DEPOTESTOTERONE CYPIONATE) 200 mg/mL injection INJECT 200 MG INTO THE MUSCLE EVERY WEEK      varenicline (CHANTIX) 1 mg Tab Chantix Continuing Month Box 1 mg tablet   Take 1 tablet twice a day by oral route for 30 days.      VENTOLIN HFA 90 mcg/actuation inhaler INHALE 2 PUFF(S) INTO THE LUNGS 3 TIMES A DAY BY INHALATION ROUTE FOR 30 DAYS.      clindamycin (CLEOCIN) 300 MG capsule Take 300 mg by mouth 3 (three) times daily.      predniSONE (DELTASONE) 10 MG tablet prednisone 10 mg tablet       No facility-administered encounter medications on file as of 7/15/2020.        Objective:     Vital Signs (Most Recent)  Vital Signs  Temp: 96.6 °F (35.9 °C)  Pulse: 74  Resp: 18  SpO2: 97 %  BP: (!) 160/100  Height and Weight  Height: 6' (182.9 cm)  Weight: 99.2 kg (218 lb 11.1 oz)  BSA (Calculated - sq m): 2.24 sq meters  BMI (Calculated): 29.7  Weight in (lb) to have BMI = 25: 183.9]  Wt Readings from Last 2 Encounters:   07/15/20 99.2 kg (218 lb 11.1 oz)   06/23/20 97.9 kg (215 lb 13.3 oz)       Physical Exam   Constitutional: He is oriented to person, place, and time. He appears well-developed and well-nourished.   HENT:   Head: Normocephalic.   Neck: Neck supple.   Cardiovascular: Normal  rate, regular rhythm and normal heart sounds.   Pulmonary/Chest: Normal expansion and effort normal. No stridor. No respiratory distress. He has rales. He exhibits no tenderness.   Basilar rales   Abdominal: Soft.   Musculoskeletal:         General: No tenderness.   Lymphadenopathy:     He has no cervical adenopathy.   Neurological: He is alert and oriented to person, place, and time. Gait normal.   Skin: Skin is warm. No cyanosis. Nails show no clubbing.   Psychiatric: He has a normal mood and affect. His behavior is normal. Judgment and thought content normal.   Nursing note and vitals reviewed.      Laboratory  Lab Results   Component Value Date    WBC 12.05 06/18/2020    RBC 5.10 06/18/2020    HGB 15.3 06/18/2020    HCT 48.6 06/18/2020    MCV 95 06/18/2020    MCH 30.0 06/18/2020    MCHC 31.5 (L) 06/18/2020    RDW 14.3 06/18/2020     (H) 06/18/2020    MPV 8.7 (L) 06/18/2020    GRAN 7.6 06/18/2020    GRAN 63.2 06/18/2020    LYMPH 3.0 06/18/2020    LYMPH 24.6 06/18/2020    MONO 1.1 (H) 06/18/2020    MONO 9.5 06/18/2020    EOS 0.2 06/18/2020    BASO 0.06 06/18/2020    EOSINOPHIL 1.4 06/18/2020    BASOPHIL 0.5 06/18/2020       BMP  Lab Results   Component Value Date     06/18/2020    K 4.0 06/18/2020     06/18/2020    CO2 27 06/18/2020    BUN 22 (H) 06/18/2020    CREATININE 1.5 (H) 06/18/2020    CALCIUM 9.0 06/18/2020    ANIONGAP 7 (L) 06/18/2020    ESTGFRAFRICA >60.0 06/18/2020    EGFRNONAA 53.9 (A) 06/18/2020    AST 28 06/18/2020    ALT 19 06/18/2020    PROT 7.3 06/18/2020       No results found for: BNP    Lab Results   Component Value Date    TSH 0.642 04/09/2010       Lab Results   Component Value Date    SEDRATE 2 06/18/2020       Lab Results   Component Value Date    CRP 2.1 06/18/2020     No results found for: IGE     No results found for: ASPERGILLUS  No results found for: AFUMIGATUSCL     No results found for: ACE     Diagnostic Results:  I have personally reviewed today the following  studies:    Outside CT chest report showed lower lobe atelectasis/consolidation/infiltrate.     Chest x-ray today     No acute cardiopulmonary process.       CT chest personally reviewed no significant abnormalities.  Await final official report.  Assessment/Plan:   Chronic obstructive pulmonary disease, unspecified COPD type  -     COVID-19 Routine Screening; Future; Expected date: 07/15/2020    Former smoker    History of pneumonia    Pleuritic chest pain    Continue albuterol p.r.n.    Continue STIOLTO     CHECK PFT.    ENCOURAGED PATIENT TO CONTINUE SMOKING CESSATION.    FOLLOW-UP WITH RHEUMATOLOGY.    Follow up in about 3 months (around 10/15/2020).    This note was prepared using voice recognition system and is likely to have sound alike errors that may have been overlooked even after proof reading.  Please call me with any questions    Discussed diagnosis, its evaluation, treatment and usual course. All questions answered.      Namrata Hammond MD

## 2020-07-16 ENCOUNTER — TELEPHONE (OUTPATIENT)
Dept: PULMONOLOGY | Facility: CLINIC | Age: 50
End: 2020-07-16

## 2020-07-16 DIAGNOSIS — R91.8 MULTIPLE LUNG NODULES ON CT: Primary | ICD-10-CM

## 2020-07-17 ENCOUNTER — OFFICE VISIT (OUTPATIENT)
Dept: RHEUMATOLOGY | Facility: CLINIC | Age: 50
End: 2020-07-17
Payer: COMMERCIAL

## 2020-07-17 VITALS
SYSTOLIC BLOOD PRESSURE: 141 MMHG | HEIGHT: 72 IN | DIASTOLIC BLOOD PRESSURE: 84 MMHG | HEART RATE: 83 BPM | WEIGHT: 219.38 LBS | BODY MASS INDEX: 29.71 KG/M2

## 2020-07-17 DIAGNOSIS — M54.41 CHRONIC LOW BACK PAIN WITH BILATERAL SCIATICA, UNSPECIFIED BACK PAIN LATERALITY: Primary | ICD-10-CM

## 2020-07-17 DIAGNOSIS — M81.8 STEROID-INDUCED OSTEOPOROSIS: ICD-10-CM

## 2020-07-17 DIAGNOSIS — T38.0X5A STEROID-INDUCED OSTEOPOROSIS: ICD-10-CM

## 2020-07-17 DIAGNOSIS — M54.42 CHRONIC LOW BACK PAIN WITH BILATERAL SCIATICA, UNSPECIFIED BACK PAIN LATERALITY: Primary | ICD-10-CM

## 2020-07-17 DIAGNOSIS — G89.29 CHRONIC LOW BACK PAIN WITH BILATERAL SCIATICA, UNSPECIFIED BACK PAIN LATERALITY: Primary | ICD-10-CM

## 2020-07-17 DIAGNOSIS — Z71.89 COUNSELING ON HEALTH PROMOTION AND DISEASE PREVENTION: ICD-10-CM

## 2020-07-17 PROCEDURE — 3008F BODY MASS INDEX DOCD: CPT | Mod: CPTII,S$GLB,, | Performed by: INTERNAL MEDICINE

## 2020-07-17 PROCEDURE — 3008F PR BODY MASS INDEX (BMI) DOCUMENTED: ICD-10-PCS | Mod: CPTII,S$GLB,, | Performed by: INTERNAL MEDICINE

## 2020-07-17 PROCEDURE — 99214 OFFICE O/P EST MOD 30 MIN: CPT | Mod: S$GLB,,, | Performed by: INTERNAL MEDICINE

## 2020-07-17 PROCEDURE — 99999 PR PBB SHADOW E&M-EST. PATIENT-LVL IV: ICD-10-PCS | Mod: PBBFAC,,, | Performed by: INTERNAL MEDICINE

## 2020-07-17 PROCEDURE — 99214 PR OFFICE/OUTPT VISIT, EST, LEVL IV, 30-39 MIN: ICD-10-PCS | Mod: S$GLB,,, | Performed by: INTERNAL MEDICINE

## 2020-07-17 PROCEDURE — 99999 PR PBB SHADOW E&M-EST. PATIENT-LVL IV: CPT | Mod: PBBFAC,,, | Performed by: INTERNAL MEDICINE

## 2020-07-17 RX ORDER — CYCLOBENZAPRINE HCL 10 MG
10 TABLET ORAL NIGHTLY
Qty: 10 TABLET | Refills: 0 | Status: SHIPPED | OUTPATIENT
Start: 2020-07-17 | End: 2020-07-27

## 2020-07-17 RX ORDER — GABAPENTIN 300 MG/1
300 CAPSULE ORAL NIGHTLY
Qty: 90 CAPSULE | Refills: 0 | Status: SHIPPED | OUTPATIENT
Start: 2020-07-17 | End: 2021-07-17

## 2020-07-17 NOTE — PROGRESS NOTES
RHEUMATOLOGY OUTPATIENT CLINIC NOTE    7/17/2020    Attending Rheumatologist: Alexi Amor  Primary Care Provider: Sg Bello Sr, MD   Physician Requesting Consultation: No referring provider defined for this encounter.  Chief Complaint/Reason For Consultation:  Follow-up (Ankyloss Spondylitis)    Subjective:       HPI  Curt Gonzalez Jr. is a 49 y.o. White male with historical diagnosis of ankylosis spondylitis consult follow-up.    Today  Last seen on late June.  Recommending imaging to assess for ankylosis.  Patient had pulmonary imaging that revealed rib fractures.  Patient with resident history of pneumonia with moderate coughing.  Continues to use systemic steroids per primary care physician.  Refers episodic chest pain that radiates laterally to worse the back.  Denies any pain upon inspiration.  Currently without rash, peripheral joint swelling,  or GI complaints.    Review of Systems   Constitutional: Negative for chills, fever and malaise/fatigue.   Eyes: Negative for pain and redness.   Respiratory: Negative for cough, hemoptysis and shortness of breath.    Cardiovascular: Positive for chest pain (Chronic, episodic.  Pleuritic.). Negative for leg swelling.   Gastrointestinal: Negative for abdominal pain, blood in stool and melena.   Genitourinary: Negative for dysuria and hematuria.   Musculoskeletal: Negative for falls and joint pain.   Skin: Negative for rash.   Neurological: Negative for tingling and focal weakness.   Psychiatric/Behavioral: Negative for memory loss. The patient does not have insomnia.      Chronic comorbid conditions affecting medical decision making today:  Past Medical History:   Diagnosis Date    Acid reflux     Arthritis     COPD (chronic obstructive pulmonary disease)     Pneumonia 05/2003     History reviewed. No pertinent surgical history.  Family History   Problem Relation Age of Onset    Cancer Mother     Cancer Father     Cancer Paternal Aunt      Cancer Paternal Uncle      Social History     Substance and Sexual Activity   Alcohol Use Not Currently     Social History     Tobacco Use   Smoking Status Former Smoker    Packs/day: 2.00    Years: 25.00    Pack years: 50.00    Types: Cigarettes    Start date:     Quit date:     Years since quittin.5     Social History     Substance and Sexual Activity   Drug Use Not Currently       Current Outpatient Medications:     diclofenac sodium (VOLTAREN) 1 % Gel, APPLY 2 TO 4-G AS NEEDED AT BEDTIME, Disp: , Rfl:     ibuprofen (ADVIL,MOTRIN) 800 MG tablet, Take 800 mg by mouth every 8 (eight) hours., Disp: , Rfl:     predniSONE (DELTASONE) 10 MG tablet, prednisone 10 mg tablet, Disp: , Rfl:     tadalafiL (CIALIS) 10 MG tablet, TAKE 1 TABLET BY MOUTH PRIOR TO SEXUAL ACTIVITY 30 TO 45 MINUTES BEFORE SEX, Disp: , Rfl:     testosterone cypionate (DEPOTESTOTERONE CYPIONATE) 200 mg/mL injection, INJECT 200 MG INTO THE MUSCLE EVERY WEEK, Disp: , Rfl:     VENTOLIN HFA 90 mcg/actuation inhaler, INHALE 2 PUFF(S) INTO THE LUNGS 3 TIMES A DAY BY INHALATION ROUTE FOR 30 DAYS., Disp: , Rfl:     albuterol (PROVENTIL) 2.5 mg /3 mL (0.083 %) nebulizer solution, USE 1 VIAL PER NEBULIZATION EVERY 4 HOURS, Disp: , Rfl:     albuterol-ipratropium (DUO-NEB) 2.5 mg-0.5 mg/3 mL nebulizer solution, INHALE 1 VIAL INTO LUNGS VIA NEBULIZER FOUR TIMES DAILY, Disp: , Rfl:     buprenorphine-naloxone (SUBOXONE) 8-2 mg Film, DISSOLVE 1 FILM UNDER TONGUE THREE TIMES DAILY, Disp: , Rfl:     buPROPion (WELLBUTRIN SR) 150 MG TBSR 12 hr tablet, bupropion HCl  mg tablet,12 hr sustained-release, Disp: , Rfl:     clindamycin (CLEOCIN) 300 MG capsule, Take 300 mg by mouth 3 (three) times daily., Disp: , Rfl:     cyclobenzaprine (FLEXERIL) 10 MG tablet, Take 1 tablet (10 mg total) by mouth every evening. for 10 days, Disp: 10 tablet, Rfl: 0    gabapentin (NEURONTIN) 300 MG capsule, Take 1 capsule (300 mg total) by mouth every  evening., Disp: 90 capsule, Rfl: 0    mometasone (NASONEX) 50 mcg/actuation nasal spray, SPRAY 2 SPRAYS INTO EACH NOSTRIL DAILY, Disp: , Rfl:     nystatin (MYCOSTATIN) 100,000 unit/mL suspension, SWISH AND SPIT 5 MLS BY MOUTH FOUR TIMES DAILY, Disp: , Rfl:     sildenafiL (VIAGRA) 50 MG tablet, TAKE ONE TABLET BY MOUTH ONE HOUR PRIOR TO SEX. MAX 2 PILLS/DAY, Disp: , Rfl:     STIOLTO RESPIMAT 2.5-2.5 mcg/actuation Mist, INHALE 2 PUFFS INTO LUNGS DAILY FOR 30 DAYS, Disp: , Rfl:     tamsulosin (FLOMAX) 0.4 mg Cap, Take 1 capsule by mouth once daily., Disp: , Rfl:     varenicline (CHANTIX) 1 mg Tab, Chantix Continuing Month Box 1 mg tablet  Take 1 tablet twice a day by oral route for 30 days., Disp: , Rfl:      Objective:         Vitals:    07/17/20 1538   BP: (!) 141/84   Pulse: 83     Physical Exam   Constitutional: No distress.   Estimated body mass index is 29.75 kg/m² as calculated from the following:    Height as of this encounter: 6' (1.829 m).    Weight as of this encounter: 99.5 kg (219 lb 5.7 oz).    Wt Readings from Last 1 Encounters:  07/17/20 1538 : 99.5 kg (219 lb 5.7 oz)     HENT:   Head: Normocephalic and atraumatic.   Eyes: Conjunctivae are normal. Pupils are equal, round, and reactive to light.   Neck: Normal range of motion.   Cardiovascular: Normal rate and intact distal pulses.    Pulmonary/Chest: Effort normal. No respiratory distress.   Abdominal: Soft. He exhibits no distension.   Neurological: He is alert. Gait normal.   Skin: No rash noted. No erythema.     Musculoskeletal: Normal range of motion. No tenderness.      Comments: : strong  No synovitis or significant squeeze tenderness    AROM: intact  PROM: intact    Devices used by patient: none       Reviewed old and all outside pertinent medical records available.    All lab results personally reviewed and interpreted by me.  Lab Results   Component Value Date    WBC 12.05 06/18/2020    HGB 15.3 06/18/2020    HCT 48.6 06/18/2020     MCV 95 06/18/2020    MCH 30.0 06/18/2020    MCHC 31.5 (L) 06/18/2020    RDW 14.3 06/18/2020     (H) 06/18/2020    MPV 8.7 (L) 06/18/2020    PLTEST Adequate 06/10/2011       Lab Results   Component Value Date     06/18/2020    K 4.0 06/18/2020     06/18/2020    CO2 27 06/18/2020     06/18/2020    BUN 22 (H) 06/18/2020    CALCIUM 9.0 06/18/2020    PROT 7.3 06/18/2020    ALBUMIN 4.1 06/18/2020    BILITOT 0.5 06/18/2020    AST 28 06/18/2020    ALKPHOS 67 06/18/2020    ALT 19 06/18/2020       Lab Results   Component Value Date    COLORU Yellow 10/01/2008    APPEARANCEUA Clear 10/01/2008    SPECGRAV 1.020 10/01/2008    PHUR 6.0 10/01/2008    PROTEINUA Negative 10/01/2008    KETONESU Negative 10/01/2008    LEUKOCYTESUR Negative 10/01/2008    NITRITE Negative 10/01/2008       Lab Results   Component Value Date    CRP 2.1 06/18/2020       Lab Results   Component Value Date    SEDRATE 2 06/18/2020       Lab Results   Component Value Date    REBECCA Negative 06/10/2011    RF Negative 11/12/2007    SEDRATE 2 06/18/2020       No components found for: 25OHVITDTOT, 30OUFLQT1, 33RTHZNI8, METHODNOTE    No results found for: URICACID    No components found for: TSPOTTB    Rheum Labs:   REBECCA negative   Rheumatoid factor negative    HLA B27 negative    Infectious Labs:   Hepatitis profile negative June 2020   QuantiFERON TB negative June 2020    Imaging:  All imaging reviewed and independently  interpreted by me.    CT chest with contrast March 2016  There is no acute bony abnormality suggested.    Chest x-ray March 2018  The heart is normal in size and configuration.  The lungs are clear and well expanded with no active infiltrate, effusion, or pneumothorax demonstrated.    Chest x-ray June 2020  Cardiac silhouette and mediastinal contours are normal.  Lungs are clear.  Osseous structures are intact.    Patient provided chest CT 6/2020:  No mention of ankylosis on visualized osseous structures    X-ray  C-spine June 2020  Straightening of the normal C-spine curvature with no normal pre vertebral soft tissues and pre dens space.  Findings consistent with remote fusion C2-3 level.  Multilevel marginal spurring.  Uniform loss of disc height at the C4-5 through C6-7 levels.  Faint visualization C6-7 disc space on the lateral swimmer's view.  C1-2 articulation and odontoid stable in appearance.    X-ray lumbar spine June 2020  Five lumbar type vertebral bodies.  Multilevel degenerative disc and facet disease.  Multilevel marginal spurring both anteriorly and posteriorly throughout the L-spine.  Uniform loss of disc height and L1-2 through L5-S1 levels.  Schmorl's nodes identified at several levels.  Multilevel facet arthropathy most prominently involving the L3-4 through L5-S1 levels.  No acute fracture or subluxation.  Pedicles and SI joints intact.    X-ray sacroiliac joints June 2020  SI joints normal in symmetric in appearance.  No lytic or sclerotic lesions noted.  Minimal degenerative change noted in the hips    CT chest July 2020  There are fractures of the left anterior and lateral 2nd rib which appear to be partially healed.  Apparent this degenerative changes, no particular region syndesmophytes identified.  Discussed with radiology.  No features of ankylosis appreciated.    DEXA scan  January 2013  FN: -2.3 (0.787)  LS: -0.3     ASSESSMENT / PLAN:     Curt Gonzalez . is a 49 y.o. White male with:    1. Chronic back pain  - historical diagnosis of AS, with no response to tumor necrosis factor inhibitors, MTX, or systemic CS  - patient refers stopped following with prior rheumatology to lack response to therapy.  - rheumatic workup unrevealing.  Acute phase reactants within normal range (persistent use of CS however).  - repeat imaging without objective features of ankylosis.  Remote fusion C2-C3, patient history of trauma a young age.  Congenital appearance.  Discussed with radiology.  Findings mostly  consistent degenerative disc disease.  - No rheumatic indication to resume immunosuppression at this time.  - if recurrent pain with inflammatory features, will consider MRI to assess for non radiographic spondyloarthritis    2. 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    - Currently without rheumatic indication to continue steroids therapy.  Will recommend to taper to discontinue.  - osteopenia on prior densitometry test per will repeat and reassess fragility fracture risk.  - recommend calcium vitamin-D supplementation.  - Avoid immobility, fall prevention   - CMP    3. Other specified counseling  - smoking cessation  - Nutrition and exercise counseling.  - Regular exercise:  Aerobic and resistance.  - Medication counseling provided.    No follow-ups on file.   RTC 3 months    Method of contact with patient concerns: [facility]    Disclaimer:  This note is prepared using voice recognition software and as such is likely to have errors and has not been proof read. Please contact me for questions.     Alexi Amor M.D.  Rheumatology Department   [facility]

## 2020-08-06 ENCOUNTER — LAB VISIT (OUTPATIENT)
Dept: LAB | Facility: HOSPITAL | Age: 50
End: 2020-08-06
Attending: INTERNAL MEDICINE
Payer: COMMERCIAL

## 2020-08-06 DIAGNOSIS — M54.42 CHRONIC LOW BACK PAIN WITH BILATERAL SCIATICA, UNSPECIFIED BACK PAIN LATERALITY: ICD-10-CM

## 2020-08-06 DIAGNOSIS — M54.41 CHRONIC LOW BACK PAIN WITH BILATERAL SCIATICA, UNSPECIFIED BACK PAIN LATERALITY: ICD-10-CM

## 2020-08-06 DIAGNOSIS — G89.29 CHRONIC LOW BACK PAIN WITH BILATERAL SCIATICA, UNSPECIFIED BACK PAIN LATERALITY: ICD-10-CM

## 2020-08-06 LAB
ALBUMIN SERPL BCP-MCNC: 3.9 G/DL (ref 3.5–5.2)
ALP SERPL-CCNC: 53 U/L (ref 55–135)
ALT SERPL W/O P-5'-P-CCNC: 19 U/L (ref 10–44)
ANION GAP SERPL CALC-SCNC: 9 MMOL/L (ref 8–16)
AST SERPL-CCNC: 14 U/L (ref 10–40)
BILIRUB SERPL-MCNC: 0.2 MG/DL (ref 0.1–1)
BUN SERPL-MCNC: 19 MG/DL (ref 6–20)
CALCIUM SERPL-MCNC: 8.9 MG/DL (ref 8.7–10.5)
CHLORIDE SERPL-SCNC: 103 MMOL/L (ref 95–110)
CO2 SERPL-SCNC: 26 MMOL/L (ref 23–29)
CREAT SERPL-MCNC: 1.7 MG/DL (ref 0.5–1.4)
EST. GFR  (AFRICAN AMERICAN): 54 ML/MIN/1.73 M^2
EST. GFR  (NON AFRICAN AMERICAN): 46 ML/MIN/1.73 M^2
GLUCOSE SERPL-MCNC: 132 MG/DL (ref 70–110)
POTASSIUM SERPL-SCNC: 3.7 MMOL/L (ref 3.5–5.1)
PROT SERPL-MCNC: 7.1 G/DL (ref 6–8.4)
SODIUM SERPL-SCNC: 138 MMOL/L (ref 136–145)

## 2020-08-06 PROCEDURE — 36415 COLL VENOUS BLD VENIPUNCTURE: CPT

## 2020-08-06 PROCEDURE — 80053 COMPREHEN METABOLIC PANEL: CPT

## 2020-08-10 ENCOUNTER — TELEPHONE (OUTPATIENT)
Dept: RHEUMATOLOGY | Facility: CLINIC | Age: 50
End: 2020-08-10

## 2020-08-10 NOTE — TELEPHONE ENCOUNTER
----- Message from Alexi Amor MD sent at 8/10/2020 12:50 PM CDT -----  Patient cannot receive test results via patient portal.  Please contact the patient and inform I personally reviewed the lab results and imaging.  Will recommend to repeat CMP in 2 weeks and come to clinic after results to discuss osteopenia with high fragility fracture risk and therapeutic options.  Should limit the use of nephrotoxic medications (such as any NSAIDs like ibuprofen) for the time being ensure adequate daily hydration.  For any questions or concerns, do not hesitate to contact me; and have the patient call the office or send a message through the patient portal for any concerns.    Thank you very much!    Sincerely,    Alexi Amor MD  Rheumatology Department   [facility] [facility]

## 2020-08-20 ENCOUNTER — TELEPHONE (OUTPATIENT)
Dept: RHEUMATOLOGY | Facility: CLINIC | Age: 50
End: 2020-08-20

## 2020-08-20 NOTE — TELEPHONE ENCOUNTER
----- Message from Evaristo Abreu sent at 8/20/2020  3:01 PM CDT -----  Regarding: Pt advice  Type:  Needs Medical Advice    Who Called: Pt   Symptoms (please be specific): Hip pain    How long has patient had these symptoms:1 week and a half   Pharmacy name and phone #:   [id number] [place] - [address] AT [place] [address] [address] [address]  Phone: [phone] Fax: [phone]  Would the patient rather a call back or a response via [facility]? Call back   Best Call Back Number: [phone] (home)   Additional Information: n/a

## 2020-08-20 NOTE — TELEPHONE ENCOUNTER
Spoke with Mr. Gonzalez he reports that he was seen by his Orthopedic and was told he had bursitis in his hip was given a steroid injection and prescribed Celebrex

## 2020-08-25 ENCOUNTER — LAB VISIT (OUTPATIENT)
Dept: LAB | Facility: HOSPITAL | Age: 50
End: 2020-08-25
Attending: INTERNAL MEDICINE
Payer: COMMERCIAL

## 2020-08-25 DIAGNOSIS — G89.29 CHRONIC LOW BACK PAIN WITH BILATERAL SCIATICA, UNSPECIFIED BACK PAIN LATERALITY: ICD-10-CM

## 2020-08-25 DIAGNOSIS — M54.42 CHRONIC LOW BACK PAIN WITH BILATERAL SCIATICA, UNSPECIFIED BACK PAIN LATERALITY: ICD-10-CM

## 2020-08-25 DIAGNOSIS — M54.41 CHRONIC LOW BACK PAIN WITH BILATERAL SCIATICA, UNSPECIFIED BACK PAIN LATERALITY: ICD-10-CM

## 2020-08-25 LAB
ALBUMIN SERPL BCP-MCNC: 3.9 G/DL (ref 3.5–5.2)
ALP SERPL-CCNC: 60 U/L (ref 55–135)
ALT SERPL W/O P-5'-P-CCNC: 18 U/L (ref 10–44)
ANION GAP SERPL CALC-SCNC: 9 MMOL/L (ref 8–16)
AST SERPL-CCNC: 16 U/L (ref 10–40)
BILIRUB SERPL-MCNC: 0.3 MG/DL (ref 0.1–1)
BUN SERPL-MCNC: 17 MG/DL (ref 6–20)
CALCIUM SERPL-MCNC: 9.3 MG/DL (ref 8.7–10.5)
CHLORIDE SERPL-SCNC: 104 MMOL/L (ref 95–110)
CO2 SERPL-SCNC: 29 MMOL/L (ref 23–29)
CREAT SERPL-MCNC: 1.8 MG/DL (ref 0.5–1.4)
EST. GFR  (AFRICAN AMERICAN): 50 ML/MIN/1.73 M^2
EST. GFR  (NON AFRICAN AMERICAN): 43 ML/MIN/1.73 M^2
GLUCOSE SERPL-MCNC: 142 MG/DL (ref 70–110)
POTASSIUM SERPL-SCNC: 5.9 MMOL/L (ref 3.5–5.1)
PROT SERPL-MCNC: 7.3 G/DL (ref 6–8.4)
SODIUM SERPL-SCNC: 142 MMOL/L (ref 136–145)

## 2020-08-25 PROCEDURE — 36415 COLL VENOUS BLD VENIPUNCTURE: CPT

## 2020-08-25 PROCEDURE — 80053 COMPREHEN METABOLIC PANEL: CPT

## 2020-08-26 ENCOUNTER — TELEPHONE (OUTPATIENT)
Dept: RHEUMATOLOGY | Facility: CLINIC | Age: 50
End: 2020-08-26

## 2020-08-26 DIAGNOSIS — N18.9 CHRONIC KIDNEY DISEASE, UNSPECIFIED CKD STAGE: Primary | ICD-10-CM

## 2020-08-28 ENCOUNTER — OFFICE VISIT (OUTPATIENT)
Dept: RHEUMATOLOGY | Facility: CLINIC | Age: 50
End: 2020-08-28
Payer: COMMERCIAL

## 2020-08-28 VITALS
HEIGHT: 72 IN | DIASTOLIC BLOOD PRESSURE: 78 MMHG | WEIGHT: 229.06 LBS | BODY MASS INDEX: 31.03 KG/M2 | HEART RATE: 94 BPM | SYSTOLIC BLOOD PRESSURE: 133 MMHG

## 2020-08-28 DIAGNOSIS — M81.8 STEROID-INDUCED OSTEOPOROSIS: Primary | ICD-10-CM

## 2020-08-28 DIAGNOSIS — Z71.89 COUNSELING ON HEALTH PROMOTION AND DISEASE PREVENTION: ICD-10-CM

## 2020-08-28 DIAGNOSIS — M54.41 CHRONIC LOW BACK PAIN WITH BILATERAL SCIATICA, UNSPECIFIED BACK PAIN LATERALITY: ICD-10-CM

## 2020-08-28 DIAGNOSIS — G89.29 CHRONIC LOW BACK PAIN WITH BILATERAL SCIATICA, UNSPECIFIED BACK PAIN LATERALITY: ICD-10-CM

## 2020-08-28 DIAGNOSIS — M54.42 CHRONIC LOW BACK PAIN WITH BILATERAL SCIATICA, UNSPECIFIED BACK PAIN LATERALITY: ICD-10-CM

## 2020-08-28 DIAGNOSIS — M81.0 AGE-RELATED OSTEOPOROSIS WITHOUT CURRENT PATHOLOGICAL FRACTURE: ICD-10-CM

## 2020-08-28 DIAGNOSIS — T38.0X5A STEROID-INDUCED OSTEOPOROSIS: Primary | ICD-10-CM

## 2020-08-28 PROCEDURE — 3008F BODY MASS INDEX DOCD: CPT | Mod: CPTII,S$GLB,, | Performed by: INTERNAL MEDICINE

## 2020-08-28 PROCEDURE — 99214 OFFICE O/P EST MOD 30 MIN: CPT | Mod: S$GLB,,, | Performed by: INTERNAL MEDICINE

## 2020-08-28 PROCEDURE — 99214 PR OFFICE/OUTPT VISIT, EST, LEVL IV, 30-39 MIN: ICD-10-PCS | Mod: S$GLB,,, | Performed by: INTERNAL MEDICINE

## 2020-08-28 PROCEDURE — 3008F PR BODY MASS INDEX (BMI) DOCUMENTED: ICD-10-PCS | Mod: CPTII,S$GLB,, | Performed by: INTERNAL MEDICINE

## 2020-08-28 PROCEDURE — 99999 PR PBB SHADOW E&M-EST. PATIENT-LVL IV: CPT | Mod: PBBFAC,,, | Performed by: INTERNAL MEDICINE

## 2020-08-28 PROCEDURE — 99999 PR PBB SHADOW E&M-EST. PATIENT-LVL IV: ICD-10-PCS | Mod: PBBFAC,,, | Performed by: INTERNAL MEDICINE

## 2020-08-28 NOTE — PATIENT INSTRUCTIONS
Understanding Trochanteric Bursitis    A bursa is a thin, slippery, sac-like film. It contains a small amount of fluid. This structure is found between bones and soft tissues in and around joints. A bursa cushions and protects a joint. It keeps parts of a joint from rubbing against each other. If a bursa becomes inflamed and irritated, it is known as bursitis.  The trochanteric bursa is found on the hip joint. It lies on top of the bump at the top of the thighbone called the greater trochanter. Inflammation of this bursa is called trochanteric bursitis.     How to say it  [id number]   Causes of trochanteric bursitis  Causes may include:  · Overuse of the hip during running or other sports, dance, or work  · Falling on or irritation to the side of the hip  This condition may occur along with other problems, such as osteoarthritis of the hip or knee, or low back problems. In rare cases, it may occur after hip surgery.  Symptoms of trochanteric bursitis  · Pain or aching on the side of the hip. The pain may travel down the leg.  · Swelling, tenderness, or warmth on the side of the hip at the bony bump at the top of the thigh  Treatment for trochanteric bursitis  These may include:  · Resting the hip. This allows the bursa to heal.  · Prescription or over-the-counter pain medicines. These help reduce inflammation, swelling, and pain.  · Cold packs and heat packs. These help reduce pain and swelling.  · Stretching and strengthening exercises. These improve flexibility and strength around the hip.  · Physical therapy. This includes exercises or other treatments.  · Injections of medicine into the bursa. This may help reduce inflammation and relieve symptoms.  Possible complications  If you dont give your hip time to heal, the problem may not go away, may return, or may get worse. Rest and treat your hip as directed.     When to call your healthcare provider  Call your healthcare provider right away if you have  any of these:  · Fever of 100.4°F (38°C) or higher, or as directed  · Redness, swelling, or warmth that gets worse  · Symptoms that dont get better with prescribed medicines, or get worse  · New symptoms   Date Last Reviewed: 3/29/2016  © 3263-3168 [employer]. [address] [place] [address]. All rights reserved. This information is not intended as a substitute for professional medical care. Always follow your healthcare professional's instructions.        Side Lying Hip Abduction (Strength)    1. Lie down on the floor on your side. Rest your head on your arm. Bend your legs at the knees.  2. Keep your feet together and lift your top leg up so that your knees are . Keep your hips steady.     3. Slowly lower your leg back down.  4. Repeat 10 times, or as instructed.  5. Switch sides if instructed.     Challenge yourself  Put an elastic band or tubing around your thighs. Raise and lower your top leg slowly and steadily.      Date Last Reviewed: 3/29/2016  © 7879-0358 [employer]. [address] [place] [address]. All rights reserved. This information is not intended as a substitute for professional medical care. Always follow your healthcare professional's instructions.        Iliotibial Band Stretch (Flexibility)    6. Stand next to a chair. Hold onto the chair with your right hand for support. Cross your right leg behind your left leg.  7. Lean your right hip toward the right. Feel the stretch at the outside of your hip.  8. Hold for 30 to 60 seconds. Then relax.  9. Repeat 2 times, or as instructed.  10. Switch sides and repeat.  11. Do this 3 times a day, or as instructed.     Tip: Dont bend forward or twist at the waist.   Date Last Reviewed: 3/29/2016  © 1775-9109 [employer]. [address] [place] [address]. All rights reserved. This information is not intended as a substitute for professional medical care. Always follow your  healthcare professional's instructions.        Preventing Osteoporosis: Meeting Your Calcium Needs    Your body needs calcium to build and repair bones. But it can't make calcium on its own. That's why it's important to eat calcium-rich foods. Some foods are naturally rich in calcium. Others have calcium added (fortified). It's best to get calcium from the foods you eat. But if you can't get enough, you may want to take calcium supplements. To meet your daily calcium needs, try the foods listed below.  Dairy Fish & beans Other sources      Source   Calcium (mg) per serving   Source   Calcium (mg) per serving   Source   Calcium (mg) per serving      Low-fat yogurt, plain   415 mg/8 oz.   Sardines, Atlantic, canned, with bones   351 mg/3 oz.   Oatmeal, instant, fortified   215 mg/1 cup   Nonfat milk   302 mg/1 cup   [place], sockeye, canned, with bones   239 mg/3 oz.   Tofu made with calcium sulfate   204 mg/3 oz.   Low-fat milk   297 mg/1 cup   Soybeans, fresh, boiled   131 mg/1/2 cup   Collards   179 mg/1/2 cup   Swiss cheese   272 mg/1 oz.   White beans, cooked   81 mg/1/2 cup   English muffin, whole wheat   175 mg/1 muffin   Cheddar cheese   205 mg/1 oz.   Navy beans, cooked   79 mg/1/2 cup   Kale   90 mg/1/2 cup   Ice cream strawberry   79 mg/1/2 cup           Orange, navel   56 mg/1 medium   Note: Calcium levels may vary depending on brand and size.  Daily calcium needs  14-18 years old: 1,300 mg  19-30 years old: 1,000 mg  31-50 years old: 1,000 mg  51-70 years old, women: 1,200 mg  51-70 years old, men: 1,000 mg  Pregnant or nursin-28 years old: 1,300 mg, 19-50 years old: 1,000 mg  Older than 70 (women and men): 1,200 mg   Date Last Reviewed: 10/17/2015  © 7296-5780 [employer]. [address] [place] [address], [place] [address]. All rights reserved. This information is not intended as a substitute for professional medical care. Always follow your healthcare professional's  instructions.        Preventing Osteoporosis: Staying Active  Certain factors can speed up bone loss or decrease bone growth. For example, a lack of activity makes bones lose their strength. Exercise plays a big part in maintaining bone mass, no matter what your age. The amount and type of activity you do also play a part in keeping your bones strong. Weight-bearing and resistance exercises, such as walking, aerobic dancing, and bicycling, are just a few of the activities that are good for your bones.     Resistance exercises, such as weight training, help maintain bones by strengthening the muscles around them. Swimming is also a good choice.     · Check with your healthcare provider before starting any new exercise program.  · Stop any exercise that causes pain.   Date Last Reviewed: 10/17/2015  © 4671-4385 [employer]. [address] [place] [address], [place] [address]. All rights reserved. This information is not intended as a substitute for professional medical care. Always follow your healthcare professional's instructions.        Preventing Osteoporosis: Avoiding Bone Loss  Certain factors can speed up bone loss or decrease bone growth. For example, alcohol, cigarettes, and certain medicines reduce bone mass. Some foods make it hard for your body to absorb calcium.    Things to avoid  Here are things to avoid to help prevent osteoporosis:  · Alcohol is toxic to bones. It is a major cause of bone loss. Heavy drinking can cause osteoporosis even if you have no other risk factors.  · Smoking reduces bone mass. Smoking may also interfere with estrogen levels and cause early menopause.  · Inactivity makes your bones lose strength and become thinner. Over time, thin bones may break. Women who aren't active are at a high risk for osteoporosis.  · Certain medicines, such as cortisone, increase bone loss. They also decrease bone growth. Ask your healthcare provider about any side effects of your medicines, and  how to prevent them.  · Protein-rich or salty foods eaten in large amounts may deplete calcium.  · Caffeine increases calcium loss. People who drink a lot of coffee, tea, or rose lose more calcium than those who don't.  Date Last Reviewed: 10/17/2015  © 9658-4390 [employer]. [address] [place] [address], [place] [address]. All rights reserved. This information is not intended as a substitute for professional medical care. Always follow your healthcare professional's instructions.        Living with Osteoporosis: Preventing Fractures  If you have osteoporosis, you can do a lot to reduce its effect on your life. Knowing how to prevent fractures and spinal curvature can help you live more comfortably and safely with this disease.    Reducing your risk for fractures  The most common fracture sites in people with osteoporosis are the wrist, spine, and hip. These fractures are often caused by accidents and falls. All fractures are painful and may limit what you can do. But hip fractures are very serious. They need surgery, and it can take months to recover. To reduce your risk for fractures:  · Get regular exercise. Try walking, swimming, or weight training.  · Eat foods that are rich in calcium, or take calcium supplements.  · Make your home safe to help avoid accidents.  · Take extra precautions not to fall in risky areas, such as icy sidewalks.  Understanding spinal fractures  Your spine is made up of many bones called vertebrae. Osteoporosis can cause the vertebrae in your spine to collapse. As a result, your upper back may arch forward, creating a curvature. Spine fractures may also result from back strain and bad posture. You will also lose height. Your lower spine must then adjust to keep your body balanced. This can cause back pain. To prevent or lessen these spinal changes:  · Practice good posture.  · Use proper techniques if you need to lift heavy objects.  · Do back exercises to help your  posture.  · Lie on your back when you have pain.  · Ask your healthcare provider about these and other ways to help your spine.  Date Last Reviewed: 10/17/2015  © 0665-7048 [employer]. [address] [place] [address], [place] [address]. All rights reserved. This information is not intended as a substitute for professional medical care. Always follow your healthcare professional's instructions.        Denosumab injection  What is this medicine?  DENOSUMAB (den oh ki mab) slows bone breakdown. Prolia is used to treat osteoporosis in women after menopause and in men. Xgeva is used to prevent bone fractures and other bone problems caused by cancer bone metastases. Xgeva is also used to treat giant cell tumor of the bone.  How should I use this medicine?  This medicine is for injection under the skin. It is given by a health care professional in a hospital or clinic setting.  If you are getting Prolia, a special MedGuide will be given to you by the pharmacist with each prescription and refill. Be sure to read this information carefully each time.  For Prolia, talk to your pediatrician regarding the use of this medicine in children. Special care may be needed. For Xgeva, talk to your pediatrician regarding the use of this medicine in children. While this drug may be prescribed for children as young as 13 years for selected conditions, precautions do apply.  What side effects may I notice from receiving this medicine?  Side effects that you should report to your doctor or health care professional as soon as possible:  · allergic reactions like skin rash, itching or hives, swelling of the face, lips, or tongue  · breathing problems  · chest pain  · fast, irregular heartbeat  · feeling faint or lightheaded, falls  · fever, chills, or any other sign of infection  · muscle spasms, tightening, or twitches  · numbness or tingling  · skin blisters or bumps, or is dry, peels, or red  · slow healing or unexplained pain in  the mouth or jaw  · unusual bleeding or bruising  Side effects that usually do not require medical attention (Report these to your doctor or health care professional if they continue or are bothersome.):  · muscle pain  · stomach upset, gas  What may interact with this medicine?  Do not take this medicine with any of the following medications:  · other medicines containing denosumab  This medicine may also interact with the following medications:  · medicines that suppress the immune system  · medicines that treat cancer  · steroid medicines like prednisone or cortisone  What if I miss a dose?  It is important not to miss your dose. Call your doctor or health care professional if you are unable to keep an appointment.  Where should I keep my medicine?  This medicine is only given in a clinic, doctor's office, or other health care setting and will not be stored at home.  What should I tell my health care provider before I take this medicine?  They need to know if you have any of these conditions:  · dental disease  · eczema  · infection or history of infections  · kidney disease or on dialysis  · low blood calcium or vitamin D  · malabsorption syndrome  · scheduled to have surgery or tooth extraction  · taking medicine that contains denosumab  · thyroid or parathyroid disease  · an unusual reaction to denosumab, other medicines, foods, dyes, or preservatives  · pregnant or trying to get pregnant  · breast-feeding  What should I watch for while using this medicine?  Visit your doctor or health care professional for regular checks on your progress. Your doctor or health care professional may order blood tests and other tests to see how you are doing.  Call your doctor or health care professional if you get a cold or other infection while receiving this medicine. Do not treat yourself. This medicine may decrease your body's ability to fight infection.  You should make sure you get enough calcium and vitamin D while you  are taking this medicine, unless your doctor tells you not to. Discuss the foods you eat and the vitamins you take with your health care professional.  See your dentist regularly. Brush and floss your teeth as directed. Before you have any dental work done, tell your dentist you are receiving this medicine.  Do not become pregnant while taking this medicine or for 5 months after stopping it. Women should inform their doctor if they wish to become pregnant or think they might be pregnant. There is a potential for serious side effects to an unborn child. Talk to your health care professional or pharmacist for more information.  NOTE:This sheet is a summary. It may not cover all possible information. If you have questions about this medicine, talk to your doctor, pharmacist, or health care provider. Copyright© 2017 [employer]

## 2020-08-28 NOTE — PROGRESS NOTES
RHEUMATOLOGY OUTPATIENT CLINIC NOTE    8/28/2020    Attending Rheumatologist: Alexi Amor  Primary Care Provider: Sg Bello Sr, MD   Physician Requesting Consultation: No referring provider defined for this encounter.  Chief Complaint/Reason For Consultation:  Hip Pain (left)    Subjective:       HPI  Curt Gonzalez Jr. is a 49 y.o. White male with historical diagnosis of ankylosis spondylitis consult follow-up.    Today  Last seen on mid July.  Recommendations given for mechanical back pain and to screen for GIOP.  No acute complaints.  Refers left trochanteric bursa area pain, short lasting relief after recent corticosteroid injection given by outside Orthopedics.  Aggravated by prolonged weight-bearing.  Relieved somewhat by rest.  Refers not taking steroids for several weeks.  Denies significant back pain at this time.    Review of Systems   Constitutional: Negative for chills, fever and malaise/fatigue.   Eyes: Negative for pain and redness.   Respiratory: Negative for cough, hemoptysis and shortness of breath.    Cardiovascular: Negative for chest pain (Episodic pleuritic features.) and leg swelling.   Gastrointestinal: Negative for abdominal pain, blood in stool and melena.   Genitourinary: Negative for dysuria and hematuria.   Musculoskeletal: Positive for joint pain (Left trochanteric bursa area, mechanical pattern.). Negative for falls.   Skin: Negative for rash.   Neurological: Negative for tingling and focal weakness.   Psychiatric/Behavioral: Negative for memory loss. The patient does not have insomnia.      Chronic comorbid conditions affecting medical decision making today:  Past Medical History:   Diagnosis Date    Acid reflux     Arthritis     COPD (chronic obstructive pulmonary disease)     Pneumonia 05/2003     History reviewed. No pertinent surgical history.  Family History   Problem Relation Age of Onset    Cancer Mother     Cancer Father     Cancer Paternal Aunt      Cancer Paternal Uncle      Social History     Substance and Sexual Activity   Alcohol Use Not Currently     Social History     Tobacco Use   Smoking Status Former Smoker    Packs/day: 2.00    Years: 25.00    Pack years: 50.00    Types: Cigarettes    Start date:     Quit date:     Years since quittin.6     Social History     Substance and Sexual Activity   Drug Use Not Currently       Current Outpatient Medications:     albuterol (PROVENTIL) 2.5 mg /3 mL (0.083 %) nebulizer solution, USE 1 VIAL PER NEBULIZATION EVERY 4 HOURS, Disp: , Rfl:     albuterol-ipratropium (DUO-NEB) 2.5 mg-0.5 mg/3 mL nebulizer solution, INHALE 1 VIAL INTO LUNGS VIA NEBULIZER FOUR TIMES DAILY, Disp: , Rfl:     buprenorphine-naloxone (SUBOXONE) 8-2 mg Film, DISSOLVE 1 FILM UNDER TONGUE THREE TIMES DAILY, Disp: , Rfl:     buPROPion (WELLBUTRIN SR) 150 MG TBSR 12 hr tablet, bupropion HCl  mg tablet,12 hr sustained-release, Disp: , Rfl:     clindamycin (CLEOCIN) 300 MG capsule, Take 300 mg by mouth 3 (three) times daily., Disp: , Rfl:     diclofenac sodium (VOLTAREN) 1 % Gel, APPLY 2 TO 4-G AS NEEDED AT BEDTIME, Disp: , Rfl:     gabapentin (NEURONTIN) 300 MG capsule, Take 1 capsule (300 mg total) by mouth every evening., Disp: 90 capsule, Rfl: 0    ibuprofen (ADVIL,MOTRIN) 800 MG tablet, Take 800 mg by mouth every 8 (eight) hours., Disp: , Rfl:     mometasone (NASONEX) 50 mcg/actuation nasal spray, SPRAY 2 SPRAYS INTO EACH NOSTRIL DAILY, Disp: , Rfl:     nystatin (MYCOSTATIN) 100,000 unit/mL suspension, SWISH AND SPIT 5 MLS BY MOUTH FOUR TIMES DAILY, Disp: , Rfl:     sildenafiL (VIAGRA) 50 MG tablet, TAKE ONE TABLET BY MOUTH ONE HOUR PRIOR TO SEX. MAX 2 PILLS/DAY, Disp: , Rfl:     STIOLTO RESPIMAT 2.5-2.5 mcg/actuation Mist, INHALE 2 PUFFS INTO LUNGS DAILY FOR 30 DAYS, Disp: , Rfl:     tadalafiL (CIALIS) 10 MG tablet, TAKE 1 TABLET BY MOUTH PRIOR TO SEXUAL ACTIVITY 30 TO 45 MINUTES BEFORE SEX, Disp: , Rfl:      tamsulosin (FLOMAX) 0.4 mg Cap, Take 1 capsule by mouth once daily., Disp: , Rfl:     testosterone cypionate (DEPOTESTOTERONE CYPIONATE) 200 mg/mL injection, INJECT 200 MG INTO THE MUSCLE EVERY WEEK, Disp: , Rfl:     varenicline (CHANTIX) 1 mg Tab, Chantix Continuing Month Box 1 mg tablet  Take 1 tablet twice a day by oral route for 30 days., Disp: , Rfl:     VENTOLIN HFA 90 mcg/actuation inhaler, INHALE 2 PUFF(S) INTO THE LUNGS 3 TIMES A DAY BY INHALATION ROUTE FOR 30 DAYS., Disp: , Rfl:     predniSONE (DELTASONE) 10 MG tablet, prednisone 10 mg tablet, Disp: , Rfl:      Objective:         Vitals:    08/28/20 1549   BP: 133/78   Pulse: 94     Physical Exam   Constitutional: No distress.   Estimated body mass index is 31.07 kg/m² as calculated from the following:    Height as of this encounter: 6' (1.829 m).    Weight as of this encounter: 103.9 kg (229 lb 0.9 oz).    Wt Readings from Last 1 Encounters:  08/28/20 1549 : 103.9 kg (229 lb 0.9 oz)     HENT:   Head: Normocephalic and atraumatic.   Eyes: Conjunctivae are normal. Pupils are equal, round, and reactive to light.   Neck: Normal range of motion.   Cardiovascular: Normal rate and intact distal pulses.    Pulmonary/Chest: Effort normal. No respiratory distress.   Abdominal: Soft. He exhibits no distension.   Neurological: He is alert. Gait normal.   Skin: No rash noted. No erythema.     Musculoskeletal: Normal range of motion.      Comments: : strong  No synovitis or significant squeeze tenderness     Reviewed old and all outside pertinent medical records available.    All lab results personally reviewed and interpreted by me.  Lab Results   Component Value Date    WBC 12.05 06/18/2020    HGB 15.3 06/18/2020    HCT 48.6 06/18/2020    MCV 95 06/18/2020    MCH 30.0 06/18/2020    MCHC 31.5 (L) 06/18/2020    RDW 14.3 06/18/2020     (H) 06/18/2020    MPV 8.7 (L) 06/18/2020    PLTEST Adequate 06/10/2011       Lab Results   Component Value Date      08/25/2020    K 5.9 (H) 08/25/2020     08/25/2020    CO2 29 08/25/2020     (H) 08/25/2020    BUN 17 08/25/2020    CALCIUM 9.3 08/25/2020    PROT 7.3 08/25/2020    ALBUMIN 3.9 08/25/2020    BILITOT 0.3 08/25/2020    AST 16 08/25/2020    ALKPHOS 60 08/25/2020    ALT 18 08/25/2020       Lab Results   Component Value Date    COLORU Yellow 10/01/2008    APPEARANCEUA Clear 10/01/2008    SPECGRAV 1.020 10/01/2008    PHUR 6.0 10/01/2008    PROTEINUA Negative 10/01/2008    KETONESU Negative 10/01/2008    LEUKOCYTESUR Negative 10/01/2008    NITRITE Negative 10/01/2008       Lab Results   Component Value Date    CRP 2.1 06/18/2020       Lab Results   Component Value Date    SEDRATE 2 06/18/2020       Lab Results   Component Value Date    REBECCA Negative 06/10/2011    RF Negative 11/12/2007    SEDRATE 2 06/18/2020       No components found for: 25OHVITDTOT, 07OIJHVE5, 21TRYEAN8, METHODNOTE    No results found for: URICACID    No components found for: TSPOTTB    Rheum Labs:   REBECCA negative   Rheumatoid factor negative    HLA B27 negative    Infectious Labs:   Hepatitis profile negative June 2020   QuantiFERON TB negative June 2020    Imaging:  All imaging reviewed and independently  interpreted by me.    CT chest with contrast March 2016  There is no acute bony abnormality suggested.    Chest x-ray March 2018  The heart is normal in size and configuration.  The lungs are clear and well expanded with no active infiltrate, effusion, or pneumothorax demonstrated.    Chest x-ray June 2020  Cardiac silhouette and mediastinal contours are normal.  Lungs are clear.  Osseous structures are intact.    Patient provided chest CT 6/2020:  No mention of ankylosis on visualized osseous structures    X-ray C-spine June 2020  Straightening of the normal C-spine curvature with no normal pre vertebral soft tissues and pre dens space.  Findings consistent with remote fusion C2-3 level.  Multilevel marginal spurring.  Uniform  loss of disc height at the C4-5 through C6-7 levels.  Faint visualization C6-7 disc space on the lateral swimmer's view.  C1-2 articulation and odontoid stable in appearance.    X-ray lumbar spine June 2020  Five lumbar type vertebral bodies.  Multilevel degenerative disc and facet disease.  Multilevel marginal spurring both anteriorly and posteriorly throughout the L-spine.  Uniform loss of disc height and L1-2 through L5-S1 levels.  Schmorl's nodes identified at several levels.  Multilevel facet arthropathy most prominently involving the L3-4 through L5-S1 levels.  No acute fracture or subluxation.  Pedicles and SI joints intact.    X-ray sacroiliac joints June 2020  SI joints normal in symmetric in appearance.  No lytic or sclerotic lesions noted.  Minimal degenerative change noted in the hips    CT chest July 2020  There are fractures of the left anterior and lateral 2nd rib which appear to be partially healed.  Apparent this degenerative changes, no particular region syndesmophytes identified.  Discussed with radiology.  No features of ankylosis appreciated.    DEXA scan  August 2020 January 2013  FN: -2.3 -2.3  LS: -1.8 -0.3  FRAX: 6.6/10.8   ASSESSMENT / PLAN:     Curt Gonzalez Jr. is a 49 y.o. White male with:    2. 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  History of CKD.  - will recommend Prolia to decrease fragility fracture risk.  Clinical significant side effects of therapy discussed in detail.  - recommended to abstain from taking systemic corticosteroids.  - recommend calcium vitamin-D supplementation.  - Avoid immobility, fall prevention     2. Chronic back pain  - historical diagnosis of AS, with no response to tumor necrosis factor inhibitors, MTX, or systemic CS  - patient refers stopped following with prior rheumatology to lack response to therapy.  - rheumatic  workup unrevealing.  Acute phase reactants within normal range (persistent use of CS however).  - repeat imaging without objective features of ankylosis.  Remote fusion C2-C3, patient history of trauma a young age.  Congenital appearance.  Discussed with radiology.  Findings mostly consistent degenerative disc disease.  - No rheumatic indication to resume immunosuppression at this time.  - if recurrent pain with inflammatory features, will consider MRI to assess for non radiographic spondyloarthritis    3. Other specified counseling  - smoking cessation  - Nutrition and exercise counseling.  - Regular exercise:  Aerobic and resistance.  - Medication counseling provided.    No follow-ups on file.   RTC 3 months    Method of contact with patient concerns: [facility]    Disclaimer:  This note is prepared using voice recognition software and as such is likely to have errors and has not been proof read. Please contact me for questions.     Alexi Amor M.D.  Rheumatology Department   [facility] [place]

## 2021-03-09 ENCOUNTER — TELEPHONE (OUTPATIENT)
Dept: RHEUMATOLOGY | Facility: CLINIC | Age: 51
End: 2021-03-09

## 2021-08-16 NOTE — TELEPHONE ENCOUNTER
ASSESSMENT:   --sore throat--resolved  --close exposure to covid  --laryngitis ---viral       PLAN:   --Covid Testing: done today and results are -negative    YET BASED ON YOUR DIRECT EXPOSURE WITH A PERSON ILL WITH COVID YOU MUST QUARANTINE.     --Supportive care is and close monitoring of symptoms is essential.  **if having fever--then use fever reducing medication.  Expect resolution of fever over 2-3 days or sooner.  If lingering fever then call your doctor and discuss your case as you may need recheck.  **if having sore throat/ nasal symptoms--then gargle with warm salt water. The symptoms could be viral \"like a cold\" or allergy related (if you have allergies).  Try over the counter supportive medications (only if tolerated).  **if having cough--then supportive care with over the counter medication (only if tolerated).  Use any medications as prescribed during this visit.  If any worsening cough, onset of shortness of breath or chest pains then must recheck.   **if having GI symptoms then watch your diet.  Avoid dairy. BRAT diet (if you have diarrhea).  Push fluids if tolerated.  Advance diet as improves.   **if having headaches, can try over the counter pain medications as tolerated.  We reviewed signs and symptoms of headaches that can be concerning such as nausea, vomiting, neck pains, rash, dizziness, weakness in arm/legs or simply a worsening / concerning headache then go to ER for CT scan of head and workup of headache.  \  --do not strain voice. It will improve with time.  Warm liquids etc.    --Your current symptoms are mild, vitals stable and can be managed as outpatient.  Your symptoms seem viral but could also evolve and become bacterial.  Therefore regardless of your test results, if not improving see your doctor for recheck.  If any worsening of symptoms--especially if worsening breathing,onset of concerning chest pains, severe/ worsening headache, continued vomiting/ diarrhea/abdominal pains  Spoke with pt and scheduled appt with Dr. Amor for 6.19.20 at 4 pm. Pt verbalized understanding    then must go to ER for further workup as you need labs, CT scan, IV fluids etc.      --Regarding your covid test results----  A negative test result means that it is unlikely you have Covid-19, however sometimes there are false negative which means you have the disease but the test does not detect it.  CONTINUE TO MONITOR SYMPTOMS WHILE WAITING FOR RESULTS AS SYMPTOMS CAN EVOLVE AND WORSEN.     **  Because you have had a direct close exposure to covid positive person--- you must follow CDC guidelines and stay home for 14 days  (from your exposure and resolution of symptoms). CURRENT CDC GUIDELINES are quarantine till fever resolved for 24 hrs (without use of fever medication)  AND quarantine from others for 14 days from your exposure to this positive person. This will allow time to see if you have caught this disease from your exposure.      You can return to work after those 14 days post exposures assuming you continue to do well with no fever or symptoms.  You can choose to get covid tested for contact tracing but should stay home for 10-14 days even if covid test is negative so you can monitor your symptoms.     Most mild covid-19 illness can be managed as outpatient. CLOSE MONITORING OF SYMPTOMS IS IMPORTANT REGARDLESS OF YOUR COVID RESULTS.  We reviewed the serious symptoms and complications of viral illnesses in detail.  Concerning symptoms can be worsening shortness of breath, chest pains, headaches, lingering fever or new symptoms of concern as reviewed during visit.  IF ANY WORSENING THEN GO TO ER for CT imaging, labs, IV fluids, nebulizer treatments or admission as warranted.